# Patient Record
Sex: FEMALE | Race: BLACK OR AFRICAN AMERICAN | NOT HISPANIC OR LATINO | Employment: FULL TIME | URBAN - METROPOLITAN AREA
[De-identification: names, ages, dates, MRNs, and addresses within clinical notes are randomized per-mention and may not be internally consistent; named-entity substitution may affect disease eponyms.]

---

## 2018-05-22 ENCOUNTER — OFFICE VISIT CONVERTED (OUTPATIENT)
Dept: ONCOLOGY | Facility: HOSPITAL | Age: 48
End: 2018-05-22
Attending: INTERNAL MEDICINE

## 2018-05-29 ENCOUNTER — OFFICE VISIT CONVERTED (OUTPATIENT)
Dept: NEUROSURGERY | Facility: CLINIC | Age: 48
End: 2018-05-29
Attending: PHYSICIAN ASSISTANT

## 2018-06-11 ENCOUNTER — OFFICE VISIT CONVERTED (OUTPATIENT)
Dept: ONCOLOGY | Facility: HOSPITAL | Age: 48
End: 2018-06-11
Attending: INTERNAL MEDICINE

## 2018-09-05 ENCOUNTER — OFFICE VISIT CONVERTED (OUTPATIENT)
Dept: NEUROSURGERY | Facility: CLINIC | Age: 48
End: 2018-09-05
Attending: PHYSICIAN ASSISTANT

## 2018-09-27 ENCOUNTER — OFFICE VISIT CONVERTED (OUTPATIENT)
Dept: NEUROSURGERY | Facility: CLINIC | Age: 48
End: 2018-09-27
Attending: NEUROLOGICAL SURGERY

## 2018-11-08 ENCOUNTER — OFFICE VISIT CONVERTED (OUTPATIENT)
Dept: NEUROSURGERY | Facility: CLINIC | Age: 48
End: 2018-11-08
Attending: NEUROLOGICAL SURGERY

## 2018-12-10 ENCOUNTER — OFFICE VISIT CONVERTED (OUTPATIENT)
Dept: ONCOLOGY | Facility: HOSPITAL | Age: 48
End: 2018-12-10
Attending: INTERNAL MEDICINE

## 2019-02-07 ENCOUNTER — OFFICE VISIT CONVERTED (OUTPATIENT)
Dept: NEUROSURGERY | Facility: CLINIC | Age: 49
End: 2019-02-07
Attending: NEUROLOGICAL SURGERY

## 2019-03-12 ENCOUNTER — OFFICE VISIT CONVERTED (OUTPATIENT)
Dept: NEUROSURGERY | Facility: CLINIC | Age: 49
End: 2019-03-12
Attending: NEUROLOGICAL SURGERY

## 2019-04-08 ENCOUNTER — OFFICE VISIT CONVERTED (OUTPATIENT)
Dept: NEUROLOGY | Facility: CLINIC | Age: 49
End: 2019-04-08
Attending: PSYCHIATRY & NEUROLOGY

## 2019-04-23 ENCOUNTER — OFFICE VISIT CONVERTED (OUTPATIENT)
Dept: NEUROSURGERY | Facility: CLINIC | Age: 49
End: 2019-04-23
Attending: NEUROLOGICAL SURGERY

## 2019-06-25 ENCOUNTER — OFFICE VISIT CONVERTED (OUTPATIENT)
Dept: NEUROSURGERY | Facility: CLINIC | Age: 49
End: 2019-06-25
Attending: NEUROLOGICAL SURGERY

## 2019-06-25 ENCOUNTER — CONVERSION ENCOUNTER (OUTPATIENT)
Dept: NEUROLOGY | Facility: CLINIC | Age: 49
End: 2019-06-25

## 2019-09-12 ENCOUNTER — HOSPITAL ENCOUNTER (OUTPATIENT)
Dept: ONCOLOGY | Facility: HOSPITAL | Age: 49
Discharge: HOME | End: 2019-09-12
Attending: INTERNAL MEDICINE

## 2019-09-12 LAB
ALBUMIN SERPL-MCNC: 4.3 G/DL (ref 3.5–5)
ALBUMIN/GLOB SERPL: 1.3 {RATIO} (ref 1.4–2.6)
ALP SERPL-CCNC: 56 U/L (ref 42–98)
ALT SERPL-CCNC: 19 U/L (ref 10–40)
ANION GAP SERPL CALC-SCNC: 16 MMOL/L (ref 8–19)
AST SERPL-CCNC: 25 U/L (ref 15–50)
BASOPHILS # BLD AUTO: 0.02 10*3/UL (ref 0–0.2)
BASOPHILS NFR BLD AUTO: 0.4 % (ref 0–3)
BILIRUB SERPL-MCNC: 0.78 MG/DL (ref 0.2–1.3)
BUN SERPL-MCNC: 12 MG/DL (ref 5–25)
BUN/CREAT SERPL: 16 {RATIO} (ref 6–20)
CALCIUM SERPL-MCNC: 9.7 MG/DL (ref 8.7–10.4)
CHLORIDE SERPL-SCNC: 100 MMOL/L (ref 99–111)
CONV ABS IMM GRAN: 0.02 10*3/UL (ref 0–0.2)
CONV CO2: 29 MMOL/L (ref 22–32)
CONV IMMATURE GRAN: 0.4 % (ref 0–1.8)
CONV TOTAL PROTEIN: 7.7 G/DL (ref 6.3–8.2)
CREAT UR-MCNC: 0.74 MG/DL (ref 0.5–0.9)
DEPRECATED RDW RBC AUTO: 42.7 FL (ref 36.4–46.3)
EOSINOPHIL # BLD AUTO: 0.16 10*3/UL (ref 0–0.7)
EOSINOPHIL # BLD AUTO: 3.3 % (ref 0–7)
ERYTHROCYTE [DISTWIDTH] IN BLOOD BY AUTOMATED COUNT: 12.6 % (ref 11.7–14.4)
GFR SERPLBLD BASED ON 1.73 SQ M-ARVRAT: >60 ML/MIN/{1.73_M2}
GLOBULIN UR ELPH-MCNC: 3.4 G/DL (ref 2–3.5)
GLUCOSE SERPL-MCNC: 108 MG/DL (ref 65–99)
HCT VFR BLD AUTO: 36.2 % (ref 37–47)
HGB BLD-MCNC: 11.5 G/DL (ref 12–16)
LDH SERPL-CCNC: 221 U/L (ref 120–240)
LYMPHOCYTES # BLD AUTO: 1.71 10*3/UL (ref 1–5)
LYMPHOCYTES NFR BLD AUTO: 34.9 % (ref 20–45)
MCH RBC QN AUTO: 29.5 PG (ref 27–31)
MCHC RBC AUTO-ENTMCNC: 31.8 G/DL (ref 33–37)
MCV RBC AUTO: 92.8 FL (ref 81–99)
MONOCYTES # BLD AUTO: 0.29 10*3/UL (ref 0.2–1.2)
MONOCYTES NFR BLD AUTO: 5.9 % (ref 3–10)
NEUTROPHILS # BLD AUTO: 2.7 10*3/UL (ref 2–8)
NEUTROPHILS NFR BLD AUTO: 55.1 % (ref 30–85)
NRBC CBCN: 0 % (ref 0–0.7)
OSMOLALITY SERPL CALC.SUM OF ELEC: 292 MOSM/KG (ref 273–304)
PLATELET # BLD AUTO: 297 10*3/UL (ref 130–400)
PMV BLD AUTO: 9.4 FL (ref 9.4–12.3)
POTASSIUM SERPL-SCNC: 3.5 MMOL/L (ref 3.5–5.3)
RBC # BLD AUTO: 3.9 10*6/UL (ref 4.2–5.4)
SODIUM SERPL-SCNC: 141 MMOL/L (ref 135–147)
WBC # BLD AUTO: 4.9 10*3/UL (ref 4.8–10.8)

## 2019-09-13 LAB
ALBUMIN SERPL-MCNC: 3.5 G/DL (ref 2.9–4.4)
ALBUMIN/GLOB SERPL: 1 {RATIO} (ref 0.7–1.7)
ALPHA2 GLOB SERPL ELPH-MCNC: 0.8 G/DL (ref 0.4–1)
BETA GLOBULIN: 1.1 G/DL (ref 0.7–1.3)
CONV ALPHA-1-GLOBULIN: 0.2 G/DL (ref 0–0.4)
CONV IMMUNOGLOBULIN G (IGG): 1294 MG/DL (ref 700–1600)
CONV IMMUNOGLOBULIN M (IGM): 99 MG/DL (ref 26–217)
CONV PE INTERPRETATION: ABNORMAL
CONV PE NOTE: ABNORMAL
CONV TOTAL PROTEIN: 7.1 G/DL (ref 6–8.5)
FREE LIGHT CHAINS: 19.2 MG/L (ref 3.3–19.4)
GAMMA GLOB SERPL ELPH-MCNC: 1.4 G/DL (ref 0.4–1.8)
GLOBULIN UR ELPH-MCNC: 3.6 G/DL (ref 2.2–3.9)
IGA SERPL-MCNC: 322 MG/DL (ref 87–352)
KAPPA LC/LAMBDA SER: 2.06 {RATIO} (ref 0.26–1.65)
LAMBDA LC FREE SERPL-MCNC: 9.3 MG/L (ref 5.7–26.3)
M-SPIKE: 0.4 G/DL
PROT PATTERN SERPL IFE-IMP: ABNORMAL

## 2019-09-18 ENCOUNTER — HOSPITAL ENCOUNTER (OUTPATIENT)
Dept: ONCOLOGY | Facility: HOSPITAL | Age: 49
Discharge: HOME | End: 2019-09-18
Attending: INTERNAL MEDICINE

## 2019-09-18 ENCOUNTER — OFFICE VISIT CONVERTED (OUTPATIENT)
Dept: ONCOLOGY | Facility: HOSPITAL | Age: 49
End: 2019-09-18
Attending: INTERNAL MEDICINE

## 2019-09-18 LAB
ALBUMIN 24H MFR UR ELPH: 57.2 %
ALPHA1 GLOB 24H MFR UR ELPH: 3.8 %
ALPHA2 GLOB 24H MFR UR ELPH: 9.8 %
B-GLOBULIN MFR UR ELPH: 14.7 %
CONV IMMUNOFIXATION (URINE): NORMAL
CONV PE NOTE: NORMAL
GAMMA GLOB 24H MFR UR ELPH: 14.5 %
M PROTEIN MFR UR ELPH: NORMAL %
PROT UR-MCNC: 6.1 MG/DL

## 2019-09-20 ENCOUNTER — HOSPITAL ENCOUNTER (OUTPATIENT)
Dept: GENERAL RADIOLOGY | Facility: HOSPITAL | Age: 49
Discharge: HOME | End: 2019-09-20
Attending: INTERNAL MEDICINE

## 2019-09-26 ENCOUNTER — OFFICE VISIT CONVERTED (OUTPATIENT)
Dept: NEUROSURGERY | Facility: CLINIC | Age: 49
End: 2019-09-26
Attending: NEUROLOGICAL SURGERY

## 2019-11-07 ENCOUNTER — HOSPITAL ENCOUNTER (OUTPATIENT)
Dept: GENERAL RADIOLOGY | Facility: HOSPITAL | Age: 49
Discharge: HOME | End: 2019-11-07
Attending: INTERNAL MEDICINE

## 2020-01-07 ENCOUNTER — OFFICE VISIT CONVERTED (OUTPATIENT)
Dept: NEUROSURGERY | Facility: CLINIC | Age: 50
End: 2020-01-07
Attending: NEUROLOGICAL SURGERY

## 2020-03-11 ENCOUNTER — HOSPITAL ENCOUNTER (OUTPATIENT)
Dept: ONCOLOGY | Facility: HOSPITAL | Age: 50
Discharge: HOME | End: 2020-03-11
Attending: INTERNAL MEDICINE

## 2020-03-11 LAB
ALBUMIN SERPL-MCNC: 4.1 G/DL (ref 3.5–5)
ALBUMIN/GLOB SERPL: 1.4 {RATIO} (ref 1.4–2.6)
ALP SERPL-CCNC: 51 U/L (ref 42–98)
ALT SERPL-CCNC: 12 U/L (ref 10–40)
ANION GAP SERPL CALC-SCNC: 14 MMOL/L (ref 8–19)
AST SERPL-CCNC: 16 U/L (ref 15–50)
BASOPHILS # BLD AUTO: 0.01 10*3/UL (ref 0–0.2)
BASOPHILS NFR BLD AUTO: 0.2 % (ref 0–3)
BILIRUB SERPL-MCNC: 0.44 MG/DL (ref 0.2–1.3)
BUN SERPL-MCNC: 15 MG/DL (ref 5–25)
BUN/CREAT SERPL: 19 {RATIO} (ref 6–20)
CALCIUM SERPL-MCNC: 9.5 MG/DL (ref 8.7–10.4)
CALCIUM SPEC-SCNC: 9.5 MG/DL (ref 8.7–10.4)
CHLORIDE SERPL-SCNC: 100 MMOL/L (ref 99–111)
CONV ABS IMM GRAN: 0 10*3/UL (ref 0–0.54)
CONV CO2: 29 MMOL/L (ref 22–32)
CONV EOSINOPHILS PERCENT BY MANUAL COUNT: 2.1 % (ref 0–7)
CONV IMMATURE GRAN: 0 % (ref 0–0.4)
CONV TOTAL PROTEIN: 7.1 G/DL (ref 6.3–8.2)
CREAT UR-MCNC: 0.78 MG/DL (ref 0.5–0.9)
EOSINOPHIL # BLD MANUAL: 0.1 10*3/UL (ref 0–0.7)
ERYTHROCYTE [DISTWIDTH] IN BLOOD BY AUTOMATED COUNT: 12.1 % (ref 11.5–14.5)
ERYTHROCYTE [DISTWIDTH] IN BLOOD BY AUTOMATED COUNT: 42 FL
GFR SERPLBLD BASED ON 1.73 SQ M-ARVRAT: >60 ML/MIN/{1.73_M2}
GLOBULIN UR ELPH-MCNC: 3 G/DL (ref 2–3.5)
GLUCOSE SERPL-MCNC: 120 MG/DL (ref 65–99)
HBA1C MFR BLD: 11.1 G/DL (ref 12–16)
HCT VFR BLD AUTO: 34.4 % (ref 37–47)
LDH SERPL-CCNC: 176 U/L (ref 120–240)
LYMPHOCYTES # BLD AUTO: 1.96 10*3/UL (ref 1–5)
LYMPHOCYTES NFR BLD AUTO: 41.2 % (ref 20–45)
MCH RBC QN AUTO: 30.2 PG (ref 27–31)
MCHC RBC AUTO-ENTMCNC: 32.3 G/DL (ref 33–37)
MCV RBC AUTO: 93.5 FL (ref 81–99)
MONOCYTES # BLD AUTO: 0.26 10*3/UL (ref 0.2–1.2)
MONOCYTES NFR BLD MANUAL: 5.5 % (ref 3–10)
NEUTROPHILS # BLD AUTO: 2.43 10*3/UL (ref 2–8)
NEUTROPHILS NFR BLD MANUAL: 51 % (ref 30–85)
OSMOLALITY SERPL CALC.SUM OF ELEC: 292 MOSM/KG (ref 273–304)
PLATELET # BLD AUTO: 265 10*3/UL (ref 130–400)
PMV BLD AUTO: 8.6 FL (ref 7.4–10.4)
POTASSIUM SERPL-SCNC: 3.2 MMOL/L (ref 3.5–5.3)
RBC MORPH BLD: 3.68 10*6/UL (ref 4.2–5.4)
SODIUM SERPL-SCNC: 140 MMOL/L (ref 135–147)
WBC # BLD AUTO: 4.76 10*3/UL (ref 4.8–10.8)

## 2020-03-12 LAB
ALBUMIN SERPL-MCNC: 3.6 G/DL (ref 2.9–4.4)
ALBUMIN/GLOB SERPL: 1.1 {RATIO} (ref 0.7–1.7)
ALPHA2 GLOB SERPL ELPH-MCNC: 0.7 G/DL (ref 0.4–1)
BETA GLOBULIN: 1.1 G/DL (ref 0.7–1.3)
CONV ALPHA-1-GLOBULIN: 0.2 G/DL (ref 0–0.4)
CONV IMMUNOGLOBULIN G (IGG): 1295 MG/DL (ref 700–1600)
CONV IMMUNOGLOBULIN M (IGM): 91 MG/DL (ref 26–217)
CONV PE INTERPRETATION: ABNORMAL
CONV PE NOTE: ABNORMAL
CONV TOTAL PROTEIN: 6.8 G/DL (ref 6–8.5)
FREE LIGHT CHAINS: 15.8 MG/L (ref 3.3–19.4)
GAMMA GLOB SERPL ELPH-MCNC: 1.1 G/DL (ref 0.4–1.8)
GLOBULIN UR ELPH-MCNC: 3.2 G/DL (ref 2.2–3.9)
IGA SERPL-MCNC: 291 MG/DL (ref 87–352)
KAPPA LC/LAMBDA SER: 2.51 {RATIO} (ref 0.26–1.65)
LAMBDA LC FREE SERPL-MCNC: 6.3 MG/L (ref 5.7–26.3)
M-SPIKE: 0.4 G/DL
PROT PATTERN SERPL IFE-IMP: ABNORMAL

## 2020-03-17 LAB
ALBUMIN 24H MFR UR ELPH: 41.1 %
ALPHA1 GLOB 24H MFR UR ELPH: 2.5 %
ALPHA2 GLOB 24H MFR UR ELPH: 11 %
B-GLOBULIN MFR UR ELPH: 30.8 %
CONV IMMUNOFIXATION (URINE): NORMAL
CONV PE NOTE: NORMAL
GAMMA GLOB 24H MFR UR ELPH: 14.7 %
M PROTEIN MFR UR ELPH: NORMAL %
PROT UR-MCNC: 39.4 MG/DL

## 2020-03-18 ENCOUNTER — HOSPITAL ENCOUNTER (OUTPATIENT)
Dept: ONCOLOGY | Facility: HOSPITAL | Age: 50
Discharge: HOME | End: 2020-03-18
Attending: INTERNAL MEDICINE

## 2020-03-18 ENCOUNTER — OFFICE VISIT CONVERTED (OUTPATIENT)
Dept: ONCOLOGY | Facility: HOSPITAL | Age: 50
End: 2020-03-18
Attending: INTERNAL MEDICINE

## 2020-09-08 ENCOUNTER — HOSPITAL ENCOUNTER (OUTPATIENT)
Dept: OTHER | Facility: HOSPITAL | Age: 50
Discharge: HOME | End: 2020-09-08
Attending: INTERNAL MEDICINE

## 2020-09-08 LAB
ALBUMIN SERPL-MCNC: 4.5 G/DL (ref 3.5–5)
ALBUMIN/GLOB SERPL: 1.4 {RATIO} (ref 1.4–2.6)
ALP SERPL-CCNC: 56 U/L (ref 42–98)
ALT SERPL-CCNC: 17 U/L (ref 10–40)
ANION GAP SERPL CALC-SCNC: 16 MMOL/L (ref 8–19)
AST SERPL-CCNC: 24 U/L (ref 15–50)
BASOPHILS # BLD AUTO: 0.02 10*3/UL (ref 0–0.2)
BASOPHILS NFR BLD AUTO: 0.4 % (ref 0–3)
BILIRUB SERPL-MCNC: 0.46 MG/DL (ref 0.2–1.3)
BUN SERPL-MCNC: 17 MG/DL (ref 5–25)
BUN/CREAT SERPL: 22 {RATIO} (ref 6–20)
CALCIUM SERPL-MCNC: 9.8 MG/DL (ref 8.7–10.4)
CHLORIDE SERPL-SCNC: 99 MMOL/L (ref 99–111)
CONV ABS IMM GRAN: 0.01 10*3/UL (ref 0–0.2)
CONV CO2: 27 MMOL/L (ref 22–32)
CONV IMMATURE GRAN: 0.2 % (ref 0–1.8)
CONV TOTAL PROTEIN: 7.8 G/DL (ref 6.3–8.2)
CREAT UR-MCNC: 0.78 MG/DL (ref 0.5–0.9)
DEPRECATED RDW RBC AUTO: 43.9 FL (ref 36.4–46.3)
EOSINOPHIL # BLD AUTO: 0.13 10*3/UL (ref 0–0.7)
EOSINOPHIL # BLD AUTO: 2.5 % (ref 0–7)
ERYTHROCYTE [DISTWIDTH] IN BLOOD BY AUTOMATED COUNT: 12.6 % (ref 11.7–14.4)
GFR SERPLBLD BASED ON 1.73 SQ M-ARVRAT: >60 ML/MIN/{1.73_M2}
GLOBULIN UR ELPH-MCNC: 3.3 G/DL (ref 2–3.5)
GLUCOSE SERPL-MCNC: 122 MG/DL (ref 65–99)
HCT VFR BLD AUTO: 39.6 % (ref 37–47)
HGB BLD-MCNC: 12.4 G/DL (ref 12–16)
LDH SERPL-CCNC: 183 U/L (ref 120–240)
LYMPHOCYTES # BLD AUTO: 2.14 10*3/UL (ref 1–5)
LYMPHOCYTES NFR BLD AUTO: 40.5 % (ref 20–45)
MCH RBC QN AUTO: 29.7 PG (ref 27–31)
MCHC RBC AUTO-ENTMCNC: 31.3 G/DL (ref 33–37)
MCV RBC AUTO: 94.7 FL (ref 81–99)
MONOCYTES # BLD AUTO: 0.27 10*3/UL (ref 0.2–1.2)
MONOCYTES NFR BLD AUTO: 5.1 % (ref 3–10)
NEUTROPHILS # BLD AUTO: 2.72 10*3/UL (ref 2–8)
NEUTROPHILS NFR BLD AUTO: 51.3 % (ref 30–85)
NRBC CBCN: 0 % (ref 0–0.7)
OSMOLALITY SERPL CALC.SUM OF ELEC: 291 MOSM/KG (ref 273–304)
PLATELET # BLD AUTO: 300 10*3/UL (ref 130–400)
PMV BLD AUTO: 9.9 FL (ref 9.4–12.3)
POTASSIUM SERPL-SCNC: 3.1 MMOL/L (ref 3.5–5.3)
RBC # BLD AUTO: 4.18 10*6/UL (ref 4.2–5.4)
SODIUM SERPL-SCNC: 139 MMOL/L (ref 135–147)
WBC # BLD AUTO: 5.29 10*3/UL (ref 4.8–10.8)

## 2020-09-09 LAB
ALBUMIN SERPL-MCNC: 3.7 G/DL (ref 2.9–4.4)
ALBUMIN/GLOB SERPL: 1 {RATIO} (ref 0.7–1.7)
ALPHA2 GLOB SERPL ELPH-MCNC: 0.8 G/DL (ref 0.4–1)
BETA GLOBULIN: 1.2 G/DL (ref 0.7–1.3)
CONV ALPHA-1-GLOBULIN: 0.3 G/DL (ref 0–0.4)
CONV IMMUNOGLOBULIN G (IGG): 1414 MG/DL (ref 586–1602)
CONV IMMUNOGLOBULIN M (IGM): 93 MG/DL (ref 26–217)
CONV PE INTERPRETATION: ABNORMAL
CONV PE NOTE: ABNORMAL
CONV TOTAL PROTEIN: 7.5 G/DL (ref 6–8.5)
FREE LIGHT CHAINS: 19.3 MG/L (ref 3.3–19.4)
GAMMA GLOB SERPL ELPH-MCNC: 1.5 G/DL (ref 0.4–1.8)
GLOBULIN UR ELPH-MCNC: 3.8 G/DL (ref 2.2–3.9)
IGA SERPL-MCNC: 336 MG/DL (ref 87–352)
KAPPA LC/LAMBDA SER: 1.91 {RATIO} (ref 0.26–1.65)
LAMBDA LC FREE SERPL-MCNC: 10.1 MG/L (ref 5.7–26.3)
M-SPIKE: 0.4 G/DL
PROT PATTERN SERPL IFE-IMP: ABNORMAL

## 2020-09-14 ENCOUNTER — HOSPITAL ENCOUNTER (OUTPATIENT)
Dept: GENERAL RADIOLOGY | Facility: HOSPITAL | Age: 50
Discharge: HOME | End: 2020-09-14
Attending: INTERNAL MEDICINE

## 2020-09-15 ENCOUNTER — OFFICE VISIT CONVERTED (OUTPATIENT)
Dept: ONCOLOGY | Facility: HOSPITAL | Age: 50
End: 2020-09-15
Attending: INTERNAL MEDICINE

## 2020-09-15 ENCOUNTER — HOSPITAL ENCOUNTER (OUTPATIENT)
Dept: OTHER | Facility: HOSPITAL | Age: 50
Discharge: HOME | End: 2020-09-15
Attending: INTERNAL MEDICINE

## 2020-12-09 ENCOUNTER — HOSPITAL ENCOUNTER (OUTPATIENT)
Dept: GASTROENTEROLOGY | Facility: HOSPITAL | Age: 50
Setting detail: HOSPITAL OUTPATIENT SURGERY
Discharge: HOME | End: 2020-12-09
Attending: INTERNAL MEDICINE

## 2021-05-15 VITALS
WEIGHT: 201.56 LBS | DIASTOLIC BLOOD PRESSURE: 82 MMHG | SYSTOLIC BLOOD PRESSURE: 130 MMHG | BODY MASS INDEX: 31.64 KG/M2 | HEIGHT: 67 IN

## 2021-05-15 VITALS
DIASTOLIC BLOOD PRESSURE: 80 MMHG | WEIGHT: 198 LBS | SYSTOLIC BLOOD PRESSURE: 141 MMHG | HEIGHT: 67 IN | BODY MASS INDEX: 31.08 KG/M2

## 2021-05-15 VITALS
HEIGHT: 67 IN | SYSTOLIC BLOOD PRESSURE: 131 MMHG | WEIGHT: 190.56 LBS | DIASTOLIC BLOOD PRESSURE: 72 MMHG | BODY MASS INDEX: 29.91 KG/M2

## 2021-05-15 VITALS
SYSTOLIC BLOOD PRESSURE: 122 MMHG | HEIGHT: 67 IN | WEIGHT: 208.12 LBS | DIASTOLIC BLOOD PRESSURE: 79 MMHG | BODY MASS INDEX: 32.66 KG/M2

## 2021-05-15 VITALS
DIASTOLIC BLOOD PRESSURE: 83 MMHG | SYSTOLIC BLOOD PRESSURE: 124 MMHG | HEIGHT: 67 IN | BODY MASS INDEX: 32.35 KG/M2 | WEIGHT: 206.12 LBS

## 2021-05-15 VITALS
HEIGHT: 67 IN | SYSTOLIC BLOOD PRESSURE: 116 MMHG | BODY MASS INDEX: 30.01 KG/M2 | WEIGHT: 191.19 LBS | DIASTOLIC BLOOD PRESSURE: 68 MMHG

## 2021-05-16 VITALS
WEIGHT: 208 LBS | SYSTOLIC BLOOD PRESSURE: 125 MMHG | BODY MASS INDEX: 32.65 KG/M2 | HEIGHT: 67 IN | DIASTOLIC BLOOD PRESSURE: 76 MMHG

## 2021-05-16 VITALS
SYSTOLIC BLOOD PRESSURE: 136 MMHG | HEIGHT: 67 IN | BODY MASS INDEX: 30.92 KG/M2 | DIASTOLIC BLOOD PRESSURE: 80 MMHG | WEIGHT: 197 LBS

## 2021-05-16 VITALS — WEIGHT: 215 LBS | HEIGHT: 67 IN | HEART RATE: 88 BPM | BODY MASS INDEX: 33.74 KG/M2

## 2021-05-28 VITALS
HEIGHT: 68 IN | SYSTOLIC BLOOD PRESSURE: 145 MMHG | HEART RATE: 66 BPM | WEIGHT: 211.42 LBS | DIASTOLIC BLOOD PRESSURE: 79 MMHG | HEART RATE: 75 BPM | BODY MASS INDEX: 32.38 KG/M2 | BODY MASS INDEX: 32.04 KG/M2 | TEMPERATURE: 97.5 F | OXYGEN SATURATION: 100 % | HEIGHT: 68 IN | SYSTOLIC BLOOD PRESSURE: 148 MMHG | DIASTOLIC BLOOD PRESSURE: 74 MMHG | RESPIRATION RATE: 16 BRPM | WEIGHT: 213.63 LBS | TEMPERATURE: 97.4 F | OXYGEN SATURATION: 100 %

## 2021-05-28 VITALS
BODY MASS INDEX: 33.77 KG/M2 | RESPIRATION RATE: 16 BRPM | BODY MASS INDEX: 29.6 KG/M2 | BODY MASS INDEX: 32.7 KG/M2 | WEIGHT: 208.78 LBS | TEMPERATURE: 97.8 F | WEIGHT: 195.33 LBS | SYSTOLIC BLOOD PRESSURE: 137 MMHG | OXYGEN SATURATION: 100 % | TEMPERATURE: 97.9 F | OXYGEN SATURATION: 100 % | DIASTOLIC BLOOD PRESSURE: 92 MMHG | HEIGHT: 68 IN | TEMPERATURE: 98 F | SYSTOLIC BLOOD PRESSURE: 152 MMHG | RESPIRATION RATE: 14 BRPM | OXYGEN SATURATION: 100 % | RESPIRATION RATE: 20 BRPM | SYSTOLIC BLOOD PRESSURE: 127 MMHG | HEART RATE: 69 BPM | HEART RATE: 71 BPM | HEART RATE: 68 BPM | WEIGHT: 215.61 LBS | DIASTOLIC BLOOD PRESSURE: 80 MMHG | DIASTOLIC BLOOD PRESSURE: 80 MMHG

## 2021-05-28 VITALS
BODY MASS INDEX: 34.77 KG/M2 | OXYGEN SATURATION: 98 % | SYSTOLIC BLOOD PRESSURE: 154 MMHG | WEIGHT: 222 LBS | RESPIRATION RATE: 16 BRPM | HEART RATE: 79 BPM | DIASTOLIC BLOOD PRESSURE: 68 MMHG | TEMPERATURE: 97.1 F

## 2021-05-28 NOTE — PROGRESS NOTES
Patient: PAULETTE BRODERICK     Acct: [id number]     Report: #[id number]  UNIT #: [id number]     : 1970    Encounter Date:2018  PRIMARY CARE: ELIZABETH GOMEZ  ***Signed***  --------------------------------------------------------------------------------------------------------------------  NURSE INTAKE      Encounter Date      May 22, 2018            Providers      Referring Provider:  LOLA RAMIRES      Primary Provider:  ELIZABETH GOMEZ            Visit Type      New Patient Visit            Chief Complaint      ELEVATED M SPIKE            PAST, FAMILY   Past Medical History      Past Medical History:  No Diabetes Type 1, No Diabetes Type 2, No Thyroid     Disease, No COPD, No Emphysema, Yes Hypertension, No Stroke, No High Cholesterol    , No Heart Attack, No Bleeding Condition, No Low or High RBC Count, No Low or     High WBC Count, No Low or High Platelet Coun, No Hepatitis, No Kidney Disease,     No Depression, No Alzheimer's Disease, No Mental Disease, No Seizures, Yes     Arthritis, No Osteoporosis, No Osteopenia, No Short of Air, No Sleep apnea, No     Liver Disease, No STD, No Enlarged Prostate, No Other      Hematology/oncology:  DENIES HX OF: Previous Treatment for CA, Anemia, Bladder     Cancer, Blood cancer, Brain cancer, Breast cancer, Cervical cancer, Coagulopathy    , Colorectal cancer, Endocrine cancer, Eye cancer, GI cancer,  cancer, Kidney     cancer, Leukemia, Leukocytosis, Leukopenia, Liver cancer, Lung cancer, Lymphoma    , Musculoskeletal cancer, Myeloma, Neurologic cancer, Oral cancer, Ovarian     cancer, Skin cancer, Stomach cancer, Thrombocytopenia, Thyroid cancer, Uterine     cancer, Other cancer history, Other hematologic history      Genetic/metabolic:  DENIES HX OF: Cystic fibrosis, Down syndrome, Other genetic     history, Other metabolic history            Past Surgical History      REPORTS HX OF: Hysterectomy, Other Past Surgical Hx (BREAST REDUCTION), DENIES     HX OF:  Cataract extraction, Thyroid surgery, Lung biopsy, CABG surgery,     Coronary stent, Valve replacement, Appendectomy, Cholecystectomy, Splenectomy,     Bladder surgery, Nephrectomy, Joint replacement, Frature repair, Skin cancer     removal, Melanoma excision, Spinal surgery, Breast biopsy, Lumpectomy,     Mastectomy, bilateral, Mastectomy, right, Mastectomy, left, Peg Tube Placement,     VAD Placement, Biopsy            Family History      DENIES HX OF: Anemia, Blood disorders, Blood Cancer, Breast cancer, Cervical     cancer, Coagulopathy, Colorectal cancer, Endocrine Cancer, Eye Cancer, GI Cancer    ,  Cancer, Kidney Cancer, Leukemia, Leukocytosis, Leukopenia, Liver Cancer,     Lung cancer, Lymphoma, Melanoma, Musculoskeletal Cancer, Myeloma, Neurologic     Cancer, Oral Cancer, Ovarian cancer, Prostate cancer, Skin Cancer, Stomach     Cancer, Testicular Cancer, Thrombocytopenia, Thyroid cancer, Uterine cancer,     Other Cancer History, Other Hematology History            Social History      Lives independently:  No            Tobacco Use      Tobacco status:  Never smoker            Alcohol Use      Alcohol intake:  None            Substance Use      Substance use:  Denies use            REVIEW OF SYSTEMS      General:  Denies: Appetite change, Excessive sweating, Fatigue, Fever, Night     sweats, Weight gain, Weight loss, Other      Eyes:  Denies: Blurred vision, Corrective lenses, Diplopia, Eye irritation, Eye     pain, Eye redness, Spots in vision, Vision loss, Other      Ears, nose, mouth, throat:  Denies: Headache, Seizures, Visual Changes, Hearing     loss, Sinus Congestion, Hoarseness, Sore throat, Other      Cardiovascular:  Denies: Chest pain, Irregular heartbeat, Palpitations, Swollen     ankles/legs, Other      Respiratory:  Denies: Chest pain, Shortness of Air, Productive cough, Coughing     blood, Other      Gastrointestinal:  Denies: Nausea, Vomiting, Problem swallowing, Frequent     heartburn,  Constipation, Diarrhea, Tarry stools, Bloody stools, Unable to     control bowels, Other      Kidney/Bladder:  Denies: Painful Urination, Change in urinary stream, Blood in     urine, Incontinence, Frequent Urination, Decreased urine stream, Other      Musculoskeletal:  Denies: New Back pain, Leg Cramps, Painful Joints, Swollen     Joints, Muscle Pain, Muscle weakness, Other      Skin:  DENIES: Jaundice, Easy Bleeding, Lesions/changes in moles, Nail changes,     Skin Discoloration, Rash, Other      Neurological:  Denies: Dizziness, Fainting, Numbness\Tingling, Paralysis,     Seizures, Other      Psychiatric:  Complains of: AAO X 3, Denies: Anxiety, Panic attacks, Depression    , Memory loss, Other      Endocrine:  DiabetesThyroid DisorderOsteoporosisEndocrine Other      Hematologic/lymphatic:  Denies: Bruising, Bleeding, Enlarged Lymph Nodes,     Recurrent infections, Other      Reproductive:  Denies Pregnant, Denies Menopause, Denies Still Menstruating,     Denies Heavy Periods, Denies Other            VITAL SIGNS AND SCORES      Vitals      Height 5 ft 8.31 in / 173.5 cm      Weight 213 lbs 10.012 oz / 96.9 kg      BSA 2.19 m2      BMI 32.2 kg/m2      Temperature 97.5 F / 36.39 C - Temporal      Pulse 75      Blood Pressure 145/79 Sitting, Left Arm      Pulse Oximetry 100%, ROOM AIR            Pain Score      Pain Assessment:           Experiencing any pain?:  No         Pain Scale Used:  Numerical         Pain Intensity:  0            Fatigue Score               Experiencing any fatigue?:  No         Fatigue (0-10 scale):  0 (none)            EXAM      General Appearance:  Alert, Oriented X3, Cooperative      Eyes:  Anicteric Sclerae, Moist Conjunctiva      HEENT:  Orophraynx clear, No Erythema, No Exudates      Neck:  Supple, Full ROM      Respiratory:  CTAB, No Diminished Breath      Abdomen\Gastro:  Soft, No NABS      Cardio:  RRR, No Murmur, No, Peripheral Edema      Skin:  Normal Temperature, No Induration       Psychiatric:  Appropriate Affect, Intact Judgement, AAO x3      Muscularskeletal:  Normal Gait and Station, Full ROM of extremeties      Lymphatic:  No Cervical, No Supraclavicular, No Infraclavicular, No Axillary,     No Inguinal, No Other            PREVENTION      Hx Influenza Vaccination:  Yes      Date Influenza Vaccine Given:  Oct 1, 2017      Influenza Vaccine Declined:  No      2 or More Falls Past Year?:  No      Fall Past Year with Injury?:  No      Hx Pneumococcal Vaccination:  Yes      Encouraged to follow-up with:  PCP regarding preventative exams.      Chart initiated by      WEN LEBLANC CMA            IMPRESSION/PLAN      Plan      Monoclonal gammopathy of unknown significance (MGUS) - D47.2            Notes      New Diagnostics      * IMMUNOGLOBULIN QUANT IGAMQ, Stat       Dx: Monoclonal gammopathy of unknown significance (MGUS) - D47.2      * FREE KAPPA LAMBDA LT CHAINS QU, Stat      * PROTEIN ELECTROPH SERUM, Stat      * CBC, Stat       Dx: Monoclonal gammopathy of unknown significance (MGUS) - D47.2      * Comp Metabolic Panel, Stat       Dx: Monoclonal gammopathy of unknown significance (MGUS) - D47.2      * LDH, Stat       Dx: Monoclonal gammopathy of unknown significance (MGUS) - D47.2      * Skeletal Survey Adult, Routine       Dx: Monoclonal gammopathy of unknown significance (MGUS) - D47.2            Patient Education      Patient Education Provided:  Yes            Electronically signed by POP PORTILLO  06/29/2020 10:55       Disclaimer: Converted document may not contain table formatting or lab diagrams. Please see [employer] for the authenticated document.

## 2021-05-28 NOTE — PROGRESS NOTES
Patient: PAULETTE BRODERICK     Acct: [id number]     Report: #[id number]  UNIT #: [id number]     : 1970    Encounter Date:2019  PRIMARY CARE: ELIZABETH GOMEZ  ***Signed***  --------------------------------------------------------------------------------------------------------------------  NURSE INTAKE      Visit Type      Established Patient Visit            Chief Complaint      FOLLOW UP            Referring Provider/Copies To      Referring Provider:  LOLA RAMIRES      Primary Care Provider:  ELIZABETH GOMEZ            History and Present Illness      Past History      Mrs. Brodercik is a very pleasant 47-year-old -American lady who presented     with left arm numbness because of which she underwent MRI of the spine. MRI of     the spine was abnormal with degenerative changes as well as abnormal signal conc    erning for primary bone marrow problem. Then patient underwent further workup     including serum protein electrophoresis and was found to have monoclonal IgG     kappa band in the gamma region. Her monoclonal spike was 0.7 g because of which     she is now being referred to hematology. Patient denies any family history of     multiple myeloma. Patient has already seen neurologist and is considering spinal    surgery for degenerative disease.      Gammaglobulin was 1.5 g and monoclonal spike was 0.7 g in (IgG kappa ).            [facility] - Hematology Interim      Patient returns today for follow-up of monoclonal protein.  Since her last     visit, she has had surgery on her neck.  She states that the surgery went well     but she is still having some numbness and.  She denies any new or unusual aches     or pains.  She denies any masses or lymphadenopathy.  She reports good appetite     and her weight is maintained.  She reports good energy level.  She notes normal     bladder and bowel habits.  No other complaints today.            Most Recent Lab Findings            Item Value  Date Time              M-Tom (NIXON) 0.4 g/dL H 9/12/19 1015             Free Kappa Light Chains 19.2 mg/L 9/12/19 1015             Free Lambda Light Chains 9.3 mg/L 9/12/19 1015             Free Kappa/Lambda Light Chain Ratio 2.06 NA H 9/12/19 1015            Laboratory Tests      9/12/19 10:15            PAST, FAMILY   Past Medical History      Past Medical History:  Hypertension      Other PMH:        ELEV. M SPIKE      Hematology/Oncology (F):  Breast Cancer      Other Hematology History:        ELEV. M SPIKE            Past Surgical History      Hysterectomy            BREAST REDUCTION, HERNIA REPAIR, CERVIX            Family History      Family History:  Breast Cancer (MATERNAL AUNT)            Social History      Marital Status:        Lives independently:  Yes      Number of Children:  1      Occupation:  SOLDIER            Tobacco Use      Tobacco status:  Never smoker            Alcohol Use      Alcohol intake:  None            Substance Use      Substance use:  Denies use            REVIEW OF SYSTEMS      General:  Admits: Fatigue, Weight Gain      Eye:  Admits Corrective Lenses      ENT:  Denies Hearing Loss      Cardiovascular:  Denies Chest Pain      Musculoskeletal:  Denies Muscle Pain, Denies Painful Joints      Integumentary:  Denies Itching      Neurologic:  Denies Dizziness, Denies Numbness\Tingling            VITAL SIGNS AND SCORES      Vitals      Weight 208 lbs 12.410 oz / 94.7 kg      Temperature 98 F / 36.67 C - Temporal      Pulse 69      Respirations 14      Blood Pressure 127/80 Sitting, Left Arm      Pulse Oximetry 100%, RM AIR            Pain Score      Pain Scale Used:  Numerical      Pain Intensity:  0            Fatigue Score      Fatigue (0-10 scale):  5            EXAM      General Appearance:  Positive for: Alert, Oriented x3, Cooperative;          Negative for: Acute Distress      Eye:  Positive for: Anicteric Sclerae, Moist Conjunctiva      Respiratory:  Positive for: CTAB, Normal Respiratory  Effort      Abdomen/Gastro:  Positive for: Normal Active Bowel Sounds, Soft;          Negative for: Distention, Hepatosplenomegaly, Tenderness      Cardiovascular:  Positive for: RRR;          Negative for: Gallop, Murmur, Peripheral Edema, Rub      Psychiatric:  Positive for: Appropriate Affect, Intact Judgement      Lymphatic:  Negative for: Axillary, Cervical, Infraclavicular, Supraclavicular            PREVENTION      Hx Influenza Vaccination:  Yes      Date Influenza Vaccine Given:  Oct 1, 2018      Influenza Vaccine Declined:  No      2 or More Falls Past Year?:  No      Fall Past Year with Injury?:  No      Hx Pneumococcal Vaccination:  No      Encouraged to follow-up with:  PCP regarding preventative exams.      Chart initiated by      SUREKHA MARIO MA            ALLERGY/MEDS      Allergies      Coded Allergies:             NO KNOWN ALLERGIES (Unverified , 9/18/19)            Medications      Last Reconciled on 12/10/18 08:42 by MARIA ELENA OLIVEIRA      Telmisartan/Hydrochlorothiazid (Micardis HCT 80-12.5 MG) 1 Each Tablet      1 TAB PO QDAY, TAB         Reported         9/18/19       DULoxetine (Cymbalta) 30 Mg Capsule.dr      60 MG PO HS, #60 CAP 0 Refills         Reported         9/18/19       (Hair Skin Nail )   No Conflict Check      1 TAB PO QDAY         Reported         8/1/18       Multivitamin/Iron/Folic Acid (CENTRUM COMPLETE MULTIVIT TAB) 1 Tab Tablet      1 TAB PO QDAY, #30 TAB 0 Refills         Reported         8/1/18       Cyanocobalamin (Vitamin B-12) 5,000 Mcg Tab.subl      5000 MCG SL QDAY, TAB         Reported         8/1/18       Cholecalciferol (Vitamin D3) 5,000 Unit Capsule      5000 UNITS PO QDAY for 30 Days, #30 CAP         Reported         8/1/18       Ubidecarenone (Co Q-10) 1 Each Capsule      100 MG PO QDAY, CAP         Reported         8/1/18       Folic Acid/Vit Bcomp,C (Super B-Complex Folic-Vit C Tb) 400 Mcg Tablet      1 TAB PO QDAY, TAB         Reported         8/1/18        Ferrous Sulfate (Iron Sulfate*) 325 Mg Tablet      325 MG PO QDAY, #30 TAB 0 Refills         Reported         8/1/18       Calcium/Vitamin D (Caltrate-D) 1 Each Tablet      1 EACH PO QDAY, #30 TAB         Reported         8/1/18       amLODIPine (amLODIPine) 10 Mg Tablet      10 MG PO QDAY, #30 TAB 0 Refills         Reported         8/1/18      Medications Reviewed:  No Changes made to meds            IMPRESSION/PLAN      Diagnosis      MGUS (monoclonal gammopathy of unknown significance) - D47.2      Patient's recent lab work continues to demonstrate a small monoclonal protein     which is stable from previous.  Her other lab work likewise remains quite     reasonable.  No evidence of progression at this point.  Continue monitoring.      RTC 6 months for the purpose with labs prior            Notes      New Medications      * DULoxetine (Cymbalta) 30 MG CAPSULE.DR: 60 MG PO HS #60      * Telmisartan/Hydrochlorothiazid (Micardis HCT 80-12.5 MG) 1 EACH TABLET: 1 TAB       PO QDAY      Discontinued Medications      * oxyCODONE-Acetaminophen 5-325 MG 1 EACH TABLET: 1-2 TAB PO Q4H PRN PAIN #36      New Diagnostics      * CBC With Auto Diff, 1 DAY         Dx: MGUS (monoclonal gammopathy of unknown significance) - D47.2      * CMP Comp Metabolic Panel, 1 DAY         Dx: MGUS (monoclonal gammopathy of unknown significance) - D47.2      * IMMUNOFIX PROT ELECTRO URINE, 1 DAY         Dx: MGUS (monoclonal gammopathy of unknown significance) - D47.2      * IMMUNOGLOBULIN QUANT IGAMQ, 1 DAY         Dx: MGUS (monoclonal gammopathy of unknown significance) - D47.2      * FREE KAPPA LAMBDA LT CHAINS QU, 1 DAY         Dx: MGUS (monoclonal gammopathy of unknown significance) - D47.2      * LDH, 1 DAY         Dx: MGUS (monoclonal gammopathy of unknown significance) - D47.2      * SPEP with Immuno (IEPS), 1 DAY         Dx: MGUS (monoclonal gammopathy of unknown significance) - D47.2      * Skeletal Survey Adult, 6 Months         Dx:  MGUS (monoclonal gammopathy of unknown significance) - D47.2      * CBC With Auto Diff, 6 Months         Dx: MGUS (monoclonal gammopathy of unknown significance) - D47.2      * CMP Comp Metabolic Panel, 6 Months         Dx: MGUS (monoclonal gammopathy of unknown significance) - D47.2      * LDH, 6 Months         Dx: MGUS (monoclonal gammopathy of unknown significance) - D47.2      * IMMUNOGLOBULIN QUANT IGAMQ, 6 Months         Dx: MGUS (monoclonal gammopathy of unknown significance) - D47.2      * IMMUNOFIX PROT ELECTRO URINE, 6 Months         Dx: MGUS (monoclonal gammopathy of unknown significance) - D47.2      * SPEP with Immuno (IEPS), 6 Months         Dx: MGUS (monoclonal gammopathy of unknown significance) - D47.2      * FREE KAPPA LAMBDA LT CHAINS QU, 6 Months         Dx: MGUS (monoclonal gammopathy of unknown significance) - D47.2            Patient Education      Patient Education Provided:  Yes                 Disclaimer: Converted document may not contain table formatting or lab diagrams. Please see [employer] [facility] for the authenticated document.

## 2021-05-28 NOTE — PROGRESS NOTES
Patient: PAULETTE BRODERICK     Acct: [id number]     Report: #[id number]  UNIT #: [id number]     : 1970    Encounter Date:2018  PRIMARY CARE: ELIZABETH GOMEZ  ***Signed***  --------------------------------------------------------------------------------------------------------------------  HISTORY OF PRESENT ILLNESS      History and Physical            Mrs. Broderick is a very pleasant 47-year-old -American lady who presented     with left arm numbness because of which she underwent MRI of the spine. MRI of     the spine was abnormal with degenerative changes as well as abnormal signal     concerning for primary bone marrow problem. Then patient underwent further     workup including serum protein electrophoresis and was found to have monoclonal     IgG kappa band in the gamma region. Her monoclonal spike was 0.7 g because of     which she is now being referred to hematology. Patient denies any family     history of multiple myeloma. Patient has already seen neurologist and is     considering spinal surgery for degenerative disease.            Most Recent Lab Findings      Gammaglobulin was 1.5 g and monoclonal spike was 0.7 g in (IgG kappa )            EXAM      Vitals            Vital Signs              Date Time  Temp Pulse Resp B/P (MAP) Pulse Ox O2 Delivery O2 Flow Rate FiO2             18 08:57 97.5 75  145/79 100 ROOM AIR              General Appearance:  Alert, Oriented X3, Cooperative      Eyes:  Anicteric Sclerae, Moist Conjunctiva      HEENT:  Orophraynx clear, No Erythema, No Exudates      Neck:  Supple, Full ROM      Respiratory:  CTAB, No Diminished Breath      Abdomen\Gastro:  Soft, No NABS      Cardio:  RRR, No Murmur, No, Peripheral Edema      Skin:  Normal Temperature, No Induration      Psychiatric:  Appropriate Affect, Intact Judgement, AAO x3      Muscularskeletal:  Normal Gait and Station, Full ROM of extremeties      Lymphatic:  No Cervical, No Supraclavicular, No  Infraclavicular, No Axillary,     No Inguinal, No Other            IMPRESSION/PLAN      Current Plan            Mrs. Valente is a very pleasant lady who is otherwise asymptomatic other than     left arm numbness which is related to degenerative spinal disease. Patient was     found to have monoclonal protein on serum protein electrophoresis. Our plan is     to complete the workup for myeloma which will include quantitative     immunoglobulins as well as serum kappa and lambda light chains and skeletal     survey. If there is further concern on lab work then we may proceed with bone     marrow aspirate and biopsy. Patient was reassured that most likely she has     monoclonal gammopathy of uncertain significance rather than active multiple     myeloma. Patient is relatively young even further diagnosis of MGUS and will     need close surveillance because of risk of progression to multiple myeloma.     Patient had number of questions which were answered to her satisfaction.            Plan      Monoclonal gammopathy of unknown significance (MGUS) - D47.2            Notes      New Diagnostics      * IMMUNOGLOBULIN QUANT IGAMQ, Stat       Dx: Monoclonal gammopathy of unknown significance (MGUS) - D47.2      * FREE KAPPA LAMBDA LT CHAINS QU, Stat      * PROTEIN ELECTROPH SERUM, Stat      * CBC, Stat       Dx: Monoclonal gammopathy of unknown significance (MGUS) - D47.2      * Comp Metabolic Panel, Stat       Dx: Monoclonal gammopathy of unknown significance (MGUS) - D47.2      * LDH, Stat       Dx: Monoclonal gammopathy of unknown significance (MGUS) - D47.2      * Skeletal Survey Adult, Routine       Dx: Monoclonal gammopathy of unknown significance (MGUS) - D47.2                 Disclaimer: Converted document may not contain table formatting or lab diagrams. Please see [employer] [facility] for the authenticated document.

## 2021-05-28 NOTE — PROGRESS NOTES
Patient: PAULETTE BRODERICK     Acct: [id number]     Report: #[id number]  UNIT #: [id number]     : 1970    Encounter Date:09/15/2020  PRIMARY CARE: ELIZABETH GOMEZ  ***Signed***  --------------------------------------------------------------------------------------------------------------------  NURSE INTAKE      Visit Type      Established Patient Visit            Chief Complaint      monoclonal gammmopathy            Referring Provider/Copies To      Primary Care Provider:  ELIZABETH GOMEZ      Copies To:   ELIZABETH GOMEZ            History and Present Illness      Past Oncology Illness History      alejandra Broderick is a very pleasant 49-year-old -American lady who presented     with left arm numbness because of which she underwent MRI of the spine. MRI of     the spine was abnormal with degenerative changes as well as abnormal signal     concerning for primary bone marrow problem. Then patient underwent further     workup including serum protein electrophoresis and was found to have monoclonal     IgG kappa band in the gamma region. Her monoclonal spike was 0.7 g because of     which she is now being referred to hematology.            HPI - Oncology Interim      Patient returns today for follow-up of monoclonal gammopathy.  She states she     has been doing well since her last visit.  She is in the midst of transitioning     jobs which has been a little stressful.  She denies fever, chills, weight loss,     night sweats or lymphadenopathy.  In fact, she notes she has gained a few     pounds.  She is not exercising quite as routinely.  She denies any unusual aches    or pains.  No new masses or lymphadenopathy.  She denies excessive bruising or     bleeding.  She reports normal bladder and bowel habits.            Clinical Trial Participant      No            ECOG Performance Status      0            Most Recent Lab Findings            Item Value  Date Time             Immunoglobulin G 1414 mg/dL 20 9098              Immunoglobulin A 336 mg/dL 20 1557             Immunoglobulin M 93 mg/dL 20 1557             M-Tom (NIXON) 0.4 g/dL H 20 1557             Free Kappa Light Chains 19.3 mg/L 20 1557             Free Lambda Light Chains 10.1 mg/L 20 1557             Free Kappa/Lambda Light Chain Ratio 1.91 NA H 20 1557            Laboratory Tests      20 15:57            Most Recent Imaging Findings      Patient: PAULETTE BRODERICK   Acct: #[id number]   Report: #[id number]            UNIT #: [id number]    DOS: 20 1328      INSURANCE:[employer]   ORDER #:[id number]      LOCATION:[facility]     : 1970            PROVIDERS      ADMITTING:     ATTENDING: TRACY IRIZARRY      FAMILY:  NONE,MD   ORDERING:  TRACY IRIZARRY         OTHER:    DICTATING:  Albino Cardenas MD            REQ #:[id number]   EXAM:BONESVA - BONE SURVEY COMPLETE      REASON FOR EXAM:        REASON FOR VISIT:  MONOCLONAL GAMMOPATHY            *******Signed******         PROCEDURE:   BONE SURVEY             COMPARISON:   [facility], CR, BONE SURVEY, 2019,     8:27.  [facility], CR, LS-SPINE COMPL W/OBLIQUE, 2019, 7:46.             INDICATIONS:   MONOCLONAL GAMMOPATHY OF UNKNOWN SIGNIFICANCE.             FINDINGS:         Skeletal survey was performed and compared directly to the prior skeletal     survey.  There are       postoperative changes identified of prior cervical fusion and postoperative     changes are identified       in the mandible.  There is thoracic and lumbar spondylosis.  No lytic or blastic    bone lesions are       identified.  No fractures seen.             CONCLUSION:         1. Negative bones series .  No evidence of lytic or blastic bone lesions.      2. Postop changes of prior cervical fusion as well as prior mandibular surgery.      3. Thoracic cervical and lumbar spondylosis              Albino Cardenas MD              Electronically Signed and Approved By: Albino Cardenas MD on 9/14/2020 at 14:13                                  Until signed, this is an unconfirmed preliminary report that may contain      errors and is subject to change.                                              STEPHEN:      D:09/14/20 1413            PAST, FAMILY   Past Medical History      Past Medical History:  Hypertension      Other PMH:        ELEV. M SPIKE      Hematology/Oncology (F):  Breast Cancer      Other Hematology History:        ELEV. M SPIKE            Past Surgical History      Hysterectomy            BREAST REDUCTION, HERNIA REPAIR, CERVIX            Family History      Family History:  Breast Cancer            Social History      Lives independently:  Yes      Number of Children:  1      Occupation:  SOLDIER            Tobacco Use      Tobacco status:  Never smoker            Substance Use      Substance use:  Denies use            REVIEW OF SYSTEMS      General:  Denies: Fatigue      Cardiovascular:  Denies Palpitations      Respiratory:  Denies: Productive Cough, Shortness of Air      Gastrointestinal:  Denies Diarrhea, Denies Problem Swallowing      Genitourinary:  Denies Painful Urination      Integumentary:  Denies Lesions, Denies Rash      Hematologic/Lymphatic:  Denies Enlarged Lymph Nodes            VITAL SIGNS AND SCORES      Vitals      Weight 222 lbs 0.052 oz / 100.7 kg      Temperature 97.1 F / 36.17 C - Temporal      Pulse 79      Respirations 16      Blood Pressure 154/68 Sitting, Left Arm      Pulse Oximetry 98%, rm air            Pain Score      Pain Scale Used:  Numerical      Pain Intensity:  0            Fatigue Score      Fatigue (0-10 scale):  0 (none)            EXAM      General Appearance:  Positive for: Alert, Cooperative;          Negative for: Acute Distress      Eye:  Positive for: Anicteric Sclerae, Moist Conjunctiva      Neck:  Positive for: Supple;          Negative for: JVD, Masses      Respiratory:  Positive  for: CTAB, Normal Respiratory Effort      Abdomen/Gastro:  Positive for: Normal Active Bowel Sounds, Soft;          Negative for: Distention, Hepatosplenomegaly, Tenderness      Cardiovascular:  Positive for: RRR;          Negative for: Gallop, Murmur, Peripheral Edema, Rub      Psychiatric:  Positive for: Appropriate Affect, Intact Judgement      Lymphatic:  Negative for: Cervical, Infraclavicular, Supraclavicular            PREVENTION      Date Influenza Vaccine Given:  Oct 1, 2018      Influenza Vaccine Declined:  No      2 or More Falls in Past Year?:  No      Fall Past Year with Injury?:  No      Encouraged to follow-up with:  PCP regarding preventative exams.      Chart initiated by      mick guerrero ma            ALLERGY/MEDS      Allergies      Coded Allergies:             NO KNOWN ALLERGIES (Unverified , 9/15/20)            Medications      Last Reconciled on 9/15/20 08:25 by TRACY IRIZARRY      Telmisartan/Hydrochlorothiazid (Micardis HCT 80-12.5 MG) 1 Each Tablet      1 TAB PO QDAY, TAB         Reported         9/18/19       DULoxetine (Cymbalta) 30 Mg Capsule.dr      60 MG PO HS, #60 CAP 0 Refills         Reported         9/18/19       (Hair Skin Nail )   No Conflict Check      1 TAB PO QDAY         Reported         8/1/18       Multivitamin/Iron/Folic Acid (CENTRUM COMPLETE MULTIVIT TAB) 1 Tab Tablet      1 TAB PO QDAY, #30 TAB 0 Refills         Reported         8/1/18       Cyanocobalamin (Vitamin B-12) 5,000 Mcg Tab.subl      5000 MCG SL QDAY, TAB         Reported         8/1/18       Cholecalciferol (Vitamin D3) (Vitamin D3) 5,000 Unit Capsule      5000 UNITS PO QDAY for 30 Days, #30 CAP         Reported         8/1/18       Ubidecarenone (Co Q-10) 1 Each Capsule      100 MG PO QDAY, CAP         Reported         8/1/18       Folic Acid/Vit Bcomp,C (Super B-Complex Folic-Vit C Tb) 400 Mcg Tablet      1 TAB PO QDAY, TAB         Reported         8/1/18       Ferrous Sulfate (Iron Sulfate*) 325 Mg  Tablet      325 MG PO QDAY, #30 TAB 0 Refills         Reported         8/1/18       Calcium/Vitamin D (Caltrate-D) 1 Each Tablet      1 EACH PO QDAY, #30 TAB         Reported         8/1/18       amLODIPine (amLODIPine) 10 Mg Tablet      10 MG PO QDAY, #30 TAB 0 Refills         Reported         8/1/18      Medications Reviewed:  No Changes made to meds            IMPRESSION/PLAN      Diagnosis      MGUS (monoclonal gammopathy of unknown significance) - D47.2            Notes      Patient is doing well.  She has no clinical symptoms.  Reviewing her lab work     with her today, she has normal CBC, creatinine, calcium, liver functions.  SPEP     does continue to demonstrate M spike of 0.4 g/dL which is unchanged over the     past 2 years.  Her free light chain ratio is mildly elevated but improved.      Skeletal survey also reviewed with patient today.  No evidence of osseous     disease.  Continued surveillance is appropriate.  RTC 6 months for that purpose     with labs prior.  Recommended flu shot this fall.      New Diagnostics      * CBC, 6 Months         Dx: MGUS (monoclonal gammopathy of unknown significance) - D47.2      * Comp Metabolic Panel, 6 Months         Dx: MGUS (monoclonal gammopathy of unknown significance) - D47.2      * IMMUNOGLOBULIN QUANT IGAMQ, 6 Months         Dx: MGUS (monoclonal gammopathy of unknown significance) - D47.2      * FREE KAPPA LAMBDA LT CHAINS QU, 6 Months         Dx: MGUS (monoclonal gammopathy of unknown significance) - D47.2      * LDH, 6 Months         Dx: MGUS (monoclonal gammopathy of unknown significance) - D47.2      * SPEP with Immuno (IEPS), 6 Months         Dx: MGUS (monoclonal gammopathy of unknown significance) - D47.2            Patient Education            Eat Well, Exercise Well, Be Well: Dietary and Fitness Guidelines      Influenza Vaccine      Patient Education Provided:  Yes            Electronically signed by TRACY IRIZARRY  09/15/2020 08:25       Disclaimer:  Converted document may not contain table formatting or lab diagrams. Please see [employer] for the authenticated document.

## 2021-05-28 NOTE — PROGRESS NOTES
Patient: PAULETTE BRODERICK     Acct: [id number]     Report: #[id number]  UNIT #: [id number]     : 1970    Encounter Date:2020  PRIMARY CARE: ELIZABETH GOMEZ  ***Signed***  --------------------------------------------------------------------------------------------------------------------  NURSE INTAKE      Visit Type      Established Patient Visit            Chief Complaint      FOLLOW UP ON MGUS            Referring Provider/Copies To      Referring Provider:  LOLA RAMIRES      Primary Care Provider:  ELIZABETH GOMEZ      Copies To:   ELIZABETH GOMEZ            History and Present Illness      Past Oncology Illness History      Mrs. Broderick is a very pleasant 47-year-old -American lady who presented     with left arm numbness because of which she underwent MRI of the spine. MRI of     the spine was abnormal with degenerative changes as well as abnormal signal     concerning for primary bone marrow problem. Then patient underwent further     workup including serum protein electrophoresis and was found to have monoclonal     IgG kappa band in the gamma region. Her monoclonal spike was 0.7 g because of     which she is now being referred to hematology. Patient denies any family history    of multiple myeloma. Patient has already seen neurologist and is considering     spinal surgery for degenerative disease.      Gammaglobulin was 1.5 g and monoclonal spike was 0.7 g in (IgG kappa ).            HPI - Oncology Interim      Patient returns today for follow-up of MGUS.  She states she has been doing well    since her last visit.  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  She denies any unusual aches or pains.  She denies lymphadenopathy.     She denies fever, chills, weight loss, night sweats.  She reports excellent     energy level, adequate for all of her daily needs.            Most Recent Lab Findings      Laboratory Tests      3/11/20 09:03            3/11/20 11:06             PAST, FAMILY   Past Medical History      Past Medical History:  Hypertension      Other PMH:        ELEV. M SPIKE      Hematology/Oncology (F):  Breast Cancer      Other Hematology History:        ELEV. M SPIKE            Past Surgical History      Hysterectomy            BREAST REDUCTION, HERNIA REPAIR, CERVIX            Family History      Family History:  Breast Cancer (MATERNAL AUNT)            Social History      Marital Status:        Lives independently:  Yes      Number of Children:  1      Occupation:  SOLDIER            Tobacco Use      Tobacco status:  Never smoker            Alcohol Use      Alcohol intake:  None            Substance Use      Substance use:  Denies use            REVIEW OF SYSTEMS      General:  Denies: Appetite Change      ENT:  Denies Headache      Cardiovascular:  Denies Chest Pain      Respiratory:  Denies: Coughing Blood, Shortness of Air      Gastrointestinal:  Denies Constipation, Denies Diarrhea      Genitourinary:  Denies Incontinence      Integumentary:  Denies Itching, Denies Rash            VITAL SIGNS AND SCORES      Vitals      Weight 215 lbs 9.758 oz / 97.8 kg      Temperature 97.8 F / 36.56 C - Temporal      Pulse 68      Respirations 20      Blood Pressure 137/80 Sitting, Left Arm      Pulse Oximetry 100%, ROOM AIR            Pain Score      Experiencing any pain?:  No      Pain Scale Used:  Numerical      Pain Intensity:  0            Fatigue Score      Experiencing any fatigue?:  No      Fatigue (0-10 scale):  0 (none)            EXAM      General Appearance:  Positive for: Alert, Cooperative;          Negative for: Acute Distress      Neck:  Positive for: Supple;          Negative for: JVD, Masses      Respiratory:  Positive for: CTAB, Normal Respiratory Effort      Abdomen/Gastro:  Positive for: Normal Active Bowel Sounds, Soft;          Negative for: Distention, Hepatosplenomegaly, Tenderness      Cardiovascular:  Positive for: RRR;          Negative  for: Gallop, Murmur, Peripheral Edema, Rub      Psychiatric:  Positive for: Appropriate Affect, Intact Judgement      Lymphatic:  Negative for: Axillary, Cervical, Infraclavicular, Supraclavicular            PREVENTION      Hx Influenza Vaccination:  Yes      Date Influenza Vaccine Given:  Oct 1, 2018      Influenza Vaccine Declined:  No      2 or More Falls Past Year?:  No      Fall Past Year with Injury?:  No      Hx Pneumococcal Vaccination:  No      Encouraged to follow-up with:  PCP regarding preventative exams.      Chart initiated by      JORGE ALBERTO WILLOUGHBY CMA            ALLERGY/MEDS      Allergies      Coded Allergies:             NO KNOWN ALLERGIES (Unverified , 3/18/20)            Medications      Last Reconciled on 3/18/20 13:01 by TRACY IRIZARRY      Telmisartan/Hydrochlorothiazid (Micardis HCT 80-12.5 MG) 1 Each Tablet      1 TAB PO QDAY, TAB         Reported         9/18/19       DULoxetine (Cymbalta) 30 Mg Capsule.dr      60 MG PO HS, #60 CAP 0 Refills         Reported         9/18/19       (Hair Skin Nail )   No Conflict Check      1 TAB PO QDAY         Reported         8/1/18       Multivitamin/Iron/Folic Acid (CENTRUM COMPLETE MULTIVIT TAB) 1 Tab Tablet      1 TAB PO QDAY, #30 TAB 0 Refills         Reported         8/1/18       Cyanocobalamin (Vitamin B-12) 5,000 Mcg Tab.subl      5000 MCG SL QDAY, TAB         Reported         8/1/18       Cholecalciferol (Vitamin D3) (Vitamin D3) 5,000 Unit Capsule      5000 UNITS PO QDAY for 30 Days, #30 CAP         Reported         8/1/18       Ubidecarenone (Co Q-10) 1 Each Capsule      100 MG PO QDAY, CAP         Reported         8/1/18       Folic Acid/Vit Bcomp,C (Super B-Complex Folic-Vit C Tb) 400 Mcg Tablet      1 TAB PO QDAY, TAB         Reported         8/1/18       Ferrous Sulfate (Iron Sulfate*) 325 Mg Tablet      325 MG PO QDAY, #30 TAB 0 Refills         Reported         8/1/18       Calcium/Vitamin D (Caltrate-D) 1 Each Tablet      1 EACH PO QDAY, #30  TAB         Reported         8/1/18       amLODIPine (amLODIPine) 10 Mg Tablet      10 MG PO QDAY, #30 TAB 0 Refills         Reported         8/1/18      Medications Reviewed:  No Changes made to meds            IMPRESSION/PLAN      Diagnosis      MGUS (monoclonal gammopathy of unknown significance) - D47.2      Patient's M spike remains stable at 0.4 g/dL.  It has been at level for more     than 2 years.  Other lab work looks good.  Plan to recheck in 6 months with labs    and skeletal survey prior.            Notes      New Diagnostics      * CBC, 6 Months         Dx: MGUS (monoclonal gammopathy of unknown significance) - D47.2      * Comp Metabolic Panel, 6 Months      * IMMUNOGLOBULIN QUANT IGAMQ, 6 Months      * FREE KAPPA LAMBDA LT CHAINS QU, 6 Months      * LDH, 6 Months      * SPEP with Immuno (IEPS), 6 Months      * Skeletal Survey Adult, 6 Months            Patient Education      Patient Education Provided:  Yes            Electronically signed by TRACY IRIZARRY  03/18/2020 13:01       Disclaimer: Converted document may not contain table formatting or lab diagrams. Please see [employer] for the authenticated document.

## 2021-05-28 NOTE — PROGRESS NOTES
Patient: PAULETTE BRODERICK     Acct: [id number]     Report: #[id number]  UNIT #: [id number]     : 1970    Encounter Date:12/10/2018  PRIMARY CARE: ELIZABETH GOMEZ  ***Signed***  --------------------------------------------------------------------------------------------------------------------  NURSE INTAKE      Visit Type      Established Patient Visit            Chief Complaint      MGUS            Referring Provider/Copies To      Referring Provider:  LOLA RAMIRES            History and Present Illness      Past History      Mrs. Broderick is a very pleasant 47-year-old -American lady who presented     with left arm numbness because of which she underwent MRI of the spine. MRI of     the spine was abnormal with degenerative changes as well as abnormal signal     concerning for primary bone marrow problem. Then patient underwent further     workup including serum protein electrophoresis and was found to have monoclonal     IgG kappa band in the gamma region. Her monoclonal spike was 0.7 g because of     which she is now being referred to hematology. Patient denies any family history    of multiple myeloma. Patient has already seen neurologist and is considering     spinal surgery for degenerative disease.      Gammaglobulin was 1.5 g and monoclonal spike was 0.7 g in (IgG kappa ).            HPI - Hematology Interim      Pt returns to clinic for f/u-6mo--doing well-states inc fatigue; however, feels     r/t recent dx of menopause and recent cervical surgery in Aug 2018.      Unfortunately she continues to experience neuropathy in LUE.  She denies acute     bone pain.  She did have skeletal survey in 2018-normal.  No lab work since     that time.  Denies fever, lymphadenopathy or distress at this time.            PAST, FAMILY   Past Medical History      Past Medical History:  Hypertension      Other PMH      ELEVATED M SPIKE      Hematology/Oncology (F):  Breast Cancer            Past Surgical History       THROAT SURGERY            Family History      Family History:  Breast Cancer (MATERNAL AUNT)            Social History      Lives independently:  Yes            Tobacco Use      Tobacco status:  Never smoker            Alcohol Use      Alcohol intake:  None            Substance Use      Substance use:  Denies use            REVIEW OF SYSTEMS      General:  Admits: Fatigue;          Denies: Appetite Change, Fever, Night Sweats, Weight Gain, Weight Loss      Eye:  Denies: Blurred Vision, Corrective Lenses, Diplopia, Eye Irritation, Eye     Pain, Eye Redness, Spots in Vision, Vision Loss      ENT:  Denies: Headache, Hearing Loss, Hoarseness, Seizures, Sinus Congestion,     Sore Throat      Cardiovascular:  Denies: Chest Pain, Edema Ankles, Edema Legs, Irregular     Heartbeat, Palpitations      Respiratory:  Denies: Coughing Blood, Productive Cough, Shortness of Air,     Wheezing      Gastrointestinal:  Denies: Bloody Stools, Constipation, Diarrhea, Frequent     Heartburn, Nausea, Problem Swallowing, Tarry Stools, Unable to Control Bowels,     Vomiting      Genitourinary (female):  Denies: Blood in Urine, Decrease Urine Stream, Frequent    Urination, Incontinence, Painful Urination      Musculoskeletal:  Denies: Back Pain, Leg Cramps, Muscle Pain, Muscle Weakness,     Painful Joints, Swollen Joints      Integumentary:  Denies: Bleeds Easily, Bruises Easily, Hair Changes, Jaundice,     Lesions, Mole Changes, Nail Changes, Pigment Changes, Rash, Skin Discoloration      Neurologic:  Denies: Dizziness, Fainting, Numbness\Tingling, Paralysis, Seizures      Psychiatric:  Denies: Anxiety, Confused, Depression, Disoriented, Memory Loss      Endocrine:  Denies: Cold Intolerance, Diabetes, Excessive Sweating, Excessive     Thirst, Excessive Urination, Heat Intolerance, Flushing, Hyperthyroidism,     Hypothyroidism      Reproductive:  Admits: Menopause;          Denies: Heavy Periods, Pregnant, Still Menstruating             VITAL SIGNS AND SCORES      Vitals      Height 5 ft 8.31 in / 173.5 cm      Weight 195 lbs 5.241 oz / 88.6 kg      BSA 2.03 m2      BMI 29.4 kg/m2      Temperature 97.9 F / 36.61 C - Temporal      Pulse 71      Respirations 16      Blood Pressure 152/92 Sitting, Left Arm      Pulse Oximetry 100%, RM AIR            Pain Score      Pain Scale Used:  Numerical      Pain Intensity:  0            Fatigue Score      Fatigue (0-10 scale):  8            EXAM      General Appearance:  Positive for: Alert, Oriented x3, Cooperative;          Negative for: Acute Distress      Neck:  Positive for: Supple;          Negative for: JVD, Masses      Respiratory:  Positive for: CTAB, Normal Respiratory Effort      Abdomen/Gastro:  Positive for: Normal Active Bowel Sounds, Soft;          Negative for: Distention, Hepatosplenomegaly, Tenderness      Cardiovascular:  Positive for: RRR;          Negative for: Gallop, Murmur, Peripheral Edema, Rub      Psychiatric:  Positive for: Appropriate Affect, Intact Judgement      Lower Extremities:  Negative for: Edema      Upper Extremities:  Negative for: Clubbing, Digital Cyanosis, Digital Ischemia      Lymphatic:  Negative for: Axillary, Cervical, Infraclavicular, Supraclavicular            PREVENTION      Hx Influenza Vaccination:  Yes      Date Influenza Vaccine Given:  Oct 1, 2018      Influenza Vaccine Declined:  No      2 or More Falls Past Year?:  No      Fall Past Year with Injury?:  No      Hx Pneumococcal Vaccination:  Yes      Encouraged to follow-up with:  PCP regarding preventative exams.      Chart initiated by      SUREKHA MARIO MA            ALLERGY/MEDS      Allergies      Coded Allergies:             NO KNOWN ALLERGIES (Unverified , 12/10/18)            Medications      Last Reconciled on 12/10/18 08:42 by MARIA ELENA OLIVEIRA      oxyCODONE/Acetaminophen 5/325 MG (oxyCODONE/Acetaminophen 5/325 MG) 1 Each     Tablet      1-2 TAB PO Q4H PRN for PAIN, #36 TAB 0 Refills          Prov: Ronan Vu         8/14/18       (Hair Skin Nail )   No Conflict Check      1 TAB PO QDAY         Reported         8/1/18       Multivitamin/Iron/Folic Acid (CENTRUM COMPLETE MULTIVIT TAB) 1 Tab Tablet      1 TAB PO QDAY, #30 TAB 0 Refills         Reported         8/1/18       Cyanocobalamin (Vitamin B-12) 5,000 Mcg Tab.subl      5000 MCG SL QDAY, TAB         Reported         8/1/18       Cholecalciferol (Vitamin D3) 5,000 Unit Capsule      5000 UNITS PO QDAY for 30 Days, #30 CAP         Reported         8/1/18       Ubidecarenone (Co Q-10) 1 Each Capsule      100 MG PO QDAY, CAP         Reported         8/1/18       Folic Acid/Vit Bcomp,C (Super B-Complex Folic-Vit C Tb) 400 Mcg Tablet      1 TAB PO QDAY, TAB         Reported         8/1/18       Ferrous Sulfate (Iron Sulfate*) 325 Mg Tablet      325 MG PO QDAY, #30 TAB 0 Refills         Reported         8/1/18       Calcium/Vitamin D (Caltrate-D) 1 Each Tablet      1 EACH PO QDAY, #30 TAB         Reported         8/1/18       amLODIPine (amLODIPine) 10 Mg Tablet      10 MG PO QDAY, #30 TAB 0 Refills         Reported         8/1/18       Valsartan/HCTZ (Diovan /25 MG) 1 Each Tablet      1 TAB PO QDAY, TAB         Reported         8/1/18      Medications Reviewed:  No Changes made to meds            IMPRESSION/PLAN      Impression      MGUS            Diagnosis      MGUS (monoclonal gammopathy of unknown significance) - D47.2      Clinically doing well.  She will have lab work today as outlined below.  The     monoclonal protein was quite small.  Monitor for stability.  Recheck in 6 months      New Diagnostics      * CMP Comp Metabolic Panel, Routine      * CBC With Auto Diff, Routine      * IMMUNOGLOBULIN QUANT IGAMQ, Routine      * FREE KAPPA LAMBDA LT CHAINS QU, Routine      * LDH, Routine      * SPEP with Immunofixation, Routine      * IMMUNOFIX PROT ELECTROPH URINE, Routine            Patient Education      Patient Education Provided:  Yes             Topics Patient Counseled on      MGUS/surveillance/risk of transition to Multiple myeloma                 Disclaimer: Converted document may not contain table formatting or lab diagrams. Please see [employer] [facility] for the authenticated document.

## 2022-01-06 ENCOUNTER — OFFICE VISIT (OUTPATIENT)
Dept: NEUROLOGY | Age: 52
End: 2022-01-06
Payer: OTHER GOVERNMENT

## 2022-01-06 VITALS
HEIGHT: 67 IN | RESPIRATION RATE: 18 BRPM | HEART RATE: 77 BPM | SYSTOLIC BLOOD PRESSURE: 132 MMHG | BODY MASS INDEX: 35.31 KG/M2 | WEIGHT: 225 LBS | DIASTOLIC BLOOD PRESSURE: 80 MMHG | OXYGEN SATURATION: 98 %

## 2022-01-06 DIAGNOSIS — R20.0 BILATERAL HAND NUMBNESS: Primary | ICD-10-CM

## 2022-01-06 PROCEDURE — 99204 OFFICE O/P NEW MOD 45 MIN: CPT | Performed by: PSYCHIATRY & NEUROLOGY

## 2022-01-06 RX ORDER — DULOXETIN HYDROCHLORIDE 60 MG/1
60 CAPSULE, DELAYED RELEASE ORAL DAILY
COMMUNITY
End: 2022-01-06

## 2022-01-06 RX ORDER — AMLODIPINE BESYLATE 10 MG/1
TABLET ORAL DAILY
COMMUNITY

## 2022-01-06 RX ORDER — GABAPENTIN 300 MG/1
300 CAPSULE ORAL 3 TIMES DAILY
COMMUNITY

## 2022-01-06 RX ORDER — TELMISARTAN AND HYDROCHLORTHIAZIDE 80; 25 MG/1; MG/1
1 TABLET ORAL DAILY
COMMUNITY

## 2022-01-06 NOTE — PROGRESS NOTES
NEUROLOGY  NEW PATIENT EVALUATION/CONSULTATION       PATIENT NAME: Maday Fitzpatrick    MRN: [id number]    REASON FOR CONSULTATION: Hand numbness    01/06/22      Previous records (physician notes, laboratory reports, and radiology reports) and imaging studies were reviewed and summarized. My recommendations will be communicated back to the patient's physician(s) via electronic medical record and/or by [facility] [facility] mail. HISTORY OF PRESENT ILLNESS:  Maday Fitzpatrick is a 45 y.o. right handed female presenting for evaluation of numbness into the hands. Symptoms started in the left hand approximately 7 years ago involving the entire hand/arm at times. She noted worsening symptoms with arms outstretched in front of her. If she sleeps on the left side, she has also noted worsening symptoms. She denies significant neck pain or radicular symptoms involving the UE. She underwent EMG 2018 which did not reveal any abnormalities per patient. Subsequent MRI Cervical spine revealed bulging disc per patient s/p ACDF 8/2018. More recently over the past year, she has noted numbness into all digits b/l upon awakening. This resolves after several minutes of moving her fingers. She endorses left upper extremity weakness since her ACDF, unchanged. No paresthesias of the lower extremities. Denies bowel/bladder abnormalities. She is taking gabapentin currently for neuropathic pain symptoms (300mg TID) prescribed by her pain management team for other chronic pain symptoms.       PAST MEDICAL HISTORY:  Past Medical History:   Diagnosis Date    Arthritis        PAST SURGICAL HISTORY:  Past Surgical History:   Procedure Laterality Date    HX BREAST REDUCTION      HX HYSTERECTOMY         FAMILY HISTORY:   Family History   Problem Relation Age of Onset    Hypertension Mother     Diabetes Mother     Hypertension Father     Diabetes Father          SOCIAL HISTORY:  Social History     Socioeconomic History    Marital status:  MEDICATIONS:   Current Outpatient Medications   Medication Sig Dispense Refill    gabapentin (NEURONTIN) 300 mg capsule Take 300 mg by mouth three (3) times daily.  telmisartan-hydroCHLOROthiazide (Micardis HCT) 80-25 mg per tablet Take 1 Tablet by mouth daily.  amLODIPine (Norvasc) 10 mg tablet Take  by mouth daily. ALLERGIES:  No Known Allergies      REVIEW OF SYSTEMS:  10 point ROS reviewed with patient. Please see scanned document under media. PHYSICAL EXAM:  Vital Signs:   Visit Vitals  /80   Pulse 77   Resp 18   Ht 5' 7\" (1.702 m)   Wt 225 lb (102.1 kg)   SpO2 98%   BMI 35.24 kg/m²        General Medical Exam:  General:  Well appearing, comfortable, in no apparent distress. Eyes/ENT: see cranial nerve examination. Neck: No masses appreciated. Full range of motion without tenderness. Respiratory:  Clear to auscultation, good air entry bilaterally. Cardiac:  Regular rate and rhythm, no murmur. GI:  Soft, non-tender, non-distended abdomen. Bowel sounds normal. No masses, organomegaly. Extremities:  No deformities, edema, or skin discoloration. Skin:  No rashes or lesions. Neurological:  · Mental Status:  Alert and oriented to person, place, and time with fluent speech. · Cranial Nerves:   CNII/III/IV/VI: visual fields full to confrontation, EOMI, PERRL, no ptosis or nystagmus. CN V: Facial sensation intact bilaterally, masseter 5/5   CN VII: Facial muscles symmetric and strong   CN VIII: Hears finger rub well bilaterally, intact vestibular function   CN IX/X: Normal palatal movement   CN XI: Full strength shoulder shrug bilaterally   CN XII: Tongue protrusion full and midline without fasciculation or atrophy  · Motor: Normal tone and muscle bulk with no pronator drift.    Individual muscle group testing:  Shoulder abduction:   Left:5/5   Right : 5/5    Shoulder adduction:   Left:5/5   Right : 5/5    Elbow flexion:      Left:5/5   Right : 5/5  Elbow extension:    Left:5/5   Right : 5/5   Wrist flexion:    Left:5/5   Right : 5/5  Wrist extension:    Left:5/5   Right : 5/5  Arm pronation:   Left:5/5   Right : 5/5  Arm supination:   Left:5/5   Right : 5/5    Finger flexion:    Left:5/5   Right : 5/5    Finger extension:   Left:5/5   Right : 5/5   Finger abduction:  Left:5/5   Right : 5/5   Finger adduction:   Left:5/5   Right : 5/5  Hip flexion:     Left:5/5   Right : 5/5         Hip extension:   Left:5/5   Right : 5/5    Knee flexion:    Left:5/5   Right : 5/5    Knee extension:   Left:5/5   Right : 5/5    Dorsiflexion:     Left:5/5   Right : 5/5  Plantar flexion:    Left:5/5   Right : 5/5      · MSRs: No crossed adductors or clonus. RIGHT  LEFT   Brachioradialis 2+ 2+   Biceps 2+ 2+   Triceps 2+ 2+   Knee 2+ 2+   Achilles 2+ 2+        Plantar response Downward Downward          · Sensation: Normal and symmetric perception of pinprick, temperature, light touch, proprioception, and vibration; (-) Romberg. (-)Tinel's wrists b/l. · Coordination: No dysmetria. Normal rapid alternating movements; finger-to-nose and heel-to- shin testing are within normal limits. · Gait: Normal native gait    ASSESSMENT:      ICD-10-CM ICD-9-CM    1. Bilateral hand numbness  R20.0 782. 0 EMG LIMITED      MRI CERV SPINE WO CONT   45year old AAF with a h/o ACDF presenting with b/l hand numbness x 1 year. Will proceed with electrodiagnostic testing for further evaluation of potential mononeuropathy/CTS versus cervical radiculopathy. Will also obtain updated cervical imaging to assess for any significant canal/neuroforaminal stenosis contributing to above presentation. Results to be discussed after her procedure. PLAN:  · EMG b/l UE  · MRI Cervical WO      I have discussed the diagnosis with the patient today and the intended plan as seen in the above orders with both the patient as well as referring provider and/or PCP via electronic correspondence.  The patient has received an after-visit summary and questions were answered concerning future plans. Jossue Cam DO  Staff Neurologist  Diplomate, American Board of Psychiatry & Neurology       CC Referring provider:  Estefania Callejas NP

## 2022-01-06 NOTE — LETTER
1/6/2022    Patient: Alfredo Bangura   YOB: 1970   Date of Visit: 1/6/2022     Rosi York NP  [facility] [address] [id number]  Via Fax: [phone]    Dear Rosi York NP,      Thank you for referring Ms. Alfredo Bangura to [address] [place] [address] for evaluation. My notes for this consultation are attached. If you have questions, please do not hesitate to call me. I look forward to following your patient along with you.       Sincerely,    Jazzmine Helm, DO

## 2022-03-16 ENCOUNTER — OFFICE VISIT (OUTPATIENT)
Dept: NEUROLOGY | Age: 52
End: 2022-03-16
Payer: OTHER GOVERNMENT

## 2022-03-16 DIAGNOSIS — G56.01 CARPAL TUNNEL SYNDROME OF RIGHT WRIST: Primary | ICD-10-CM

## 2022-03-16 PROCEDURE — 95911 NRV CNDJ TEST 9-10 STUDIES: CPT | Performed by: PSYCHIATRY & NEUROLOGY

## 2022-03-16 PROCEDURE — 95886 MUSC TEST DONE W/N TEST COMP: CPT | Performed by: PSYCHIATRY & NEUROLOGY

## 2022-03-16 NOTE — PROGRESS NOTES
[facility] Neurology [facility]  [address] [facility], [address] [place] [address]  Phone [phone] Fax [phone]  Test Date:  3/16/2022    Patient: Evelyn Vila : 1970 Physician: Sharmila Cisneros. Judd Alex DO   Sex: Female Height: 5' 7\" Ref Phys: Sharmila Cisneros. Judd Alex DO   ID#: [id number]  Weight: 225 lbs. Technician: Melinda Umanzor     Patient Complaints:  Bilateral upper extremity paresthesias    NCV & EMG Findings:  Evaluation of the right median sensory nerve showed prolonged distal peak latency (3.8 ms) and decreased conduction velocity (Wrist-2nd Digit, 37 m/s). All remaining nerves  were within normal limits. All F Wave latencies were within normal limits. All examined muscles (as indicated in the following table) showed no evidence of electrical instability. Impression:  Extensive electrodiagnostic examination of the upper extremities bilaterally reveals changes most consistent with the followin. A right median neuropathy at or distal to the wrist, consistent with a clinical diagnosis of carpal tunnel syndrome, mild in degree electrically. 2. No evidence of a right or left cervical motor radiculopathy. ___________________________  Jared Alex DO        Nerve Conduction Studies  Anti Sensory Summary Table     Stim Site NR Peak (ms) Norm Peak (ms) P-T Amp (µV) Norm P-T Amp Onset (ms) Site1 Site2 Delta-P (ms) Dist (cm) Giovani (m/s) Norm Giovani (m/s)   Left Median Anti Sensory (2nd Digit)  32.5°C   Wrist    3.0 <3.6 39.2 >10 2.3 Wrist 2nd Digit 3.0 14.0 47 >39   Right Median Anti Sensory (2nd Digit)  33.4°C   Wrist    3.8 <3.6 29.1 >10 3.2 Wrist 2nd Digit 3.8 14.0 37 >39   Left Radial Anti Sensory (Base 1st Digit)  33.9°C   Wrist    2.2 <3.1 39.9  1.7 Wrist Base 1st Digit 2.2 0.0     Right Radial Anti Sensory (Base 1st Digit)  33.7°C   Wrist    1.8 <3.1 78.8  1.3 Wrist Base 1st Digit 1.8 0.0     Left Ulnar Anti Sensory (5th Digit)  32.7°C   Wrist    2.9 <3.7 63.2 >15.0 2.3 Wrist 5th Digit 2.9 14.0 48 >38   Right Ulnar Anti Sensory (5th Digit)  33.6°C   Wrist    2.7 <3.7 39.4 >15.0 2.2 Wrist 5th Digit 2.7 14.0 52 >38     Motor Summary Table     Stim Site NR Onset (ms) Norm Onset (ms) O-P Amp (mV) Norm O-P Amp Site1 Site2 Delta-0 (ms) Dist (cm) Giovani (m/s) Norm Giovani (m/s)   Left Median Motor (Abd Poll Brev)  32.9°C   Wrist    3.3 <4.2 9.9 >5 Elbow Wrist 3.1 20.0 65 >50   Elbow    6.4  9.8          Right Median Motor (Abd Poll Brev)  33.8°C   Wrist    3.9 <4.2 6.4 >5 Elbow Wrist 3.9 22.0 56 >50   Elbow    7.8  5.5          Left Ulnar Motor (Abd Dig Minimi)  34.1°C   Wrist    3.1 <4.2 9.7 >3 B Elbow Wrist 2.5 17.0 68 >53   B Elbow    5.6  9.5  A Elbow B Elbow 1.7 10.0 59 >53   A Elbow    7.3  9.2          Right Ulnar Motor (Abd Dig Minimi)  34.2°C   Wrist    2.9 <4.2 8.7 >3 B Elbow Wrist 2.6 17.0 65 >53   B Elbow    5.5  8.4  A Elbow B Elbow 1.8 10.0 56 >53   A Elbow    7.3  8.1            Comparison Summary Table     Stim Site NR Peak (ms) Norm Peak (ms) P-T Amp (µV) Site1 Site2 Delta-P (ms) Norm Delta (ms)   Left Median/Ulnar Palm Comparison (Wrist - 8cm)  33.6°C   Median Palm    1.9 <2.5 28.2 Median Palm Ulnar Palm 0.3 <0.3   Ulnar Palm    1.6 <2.5 12.1         F Wave Studies     NR F-Lat (ms) Lat Norm (ms) L-R F-Lat (ms) L-R Lat Norm   Left Ulnar (Mrkrs) (Abd Dig Min)  34.2°C      26.01 <36  <2.5     EMG     Side Muscle Nerve Root Ins Act Fibs Psw Amp Dur Poly Recrt Int Pat Comment   Left 1stDorInt Ulnar C8-T1 Nml Nml Nml Nml Nml 0 Nml Nml    Left FlexPolLong Median (Ant Int) C7-8 Nml Nml Nml Nml Nml 0 Nml Nml    Left Ext Indicis Radial (Post Int) C7-8 Nml Nml Nml Nml Nml 0 Nml Nml    Left Biceps Musculocut C5-6 Nml Nml Nml Nml Nml 0 Nml Nml    Left Triceps Radial C6-7-8 Nml Nml Nml Nml Nml 0 Nml Nml    Left Deltoid Axillary C5-6 Nml Nml Nml Nml Nml 0 Nml Nml    Right 1stDorInt Ulnar C8-T1 Nml Nml Nml Nml Nml 0 Nml Nml    Right Abd Poll Brev Median C8-T1 Nml Nml Nml Nml Nml 0 Nml Nml    Right FlexPolLong Median (Ant Int) C7-8 Nml Nml Nml Nml Nml 0 Nml Nml    Right Ext Indicis Radial (Post Int) C7-8 Nml Nml Nml Nml Nml 0 Nml Nml    Right Biceps Musculocut C5-6 Nml Nml Nml Nml Nml 0 Nml Nml    Right Triceps Radial C6-7-8 Nml Nml Nml Nml Nml 0 Nml Nml    Right Deltoid Axillary C5-6 Nml Nml Nml Nml Nml 0 Nml Nml          Waveforms:

## 2022-04-06 ENCOUNTER — HOSPITAL ENCOUNTER (OUTPATIENT)
Dept: MRI IMAGING | Age: 52
Discharge: HOME | End: 2022-04-06
Attending: PSYCHIATRY & NEUROLOGY
Payer: OTHER GOVERNMENT

## 2022-04-06 DIAGNOSIS — R20.0 BILATERAL HAND NUMBNESS: ICD-10-CM

## 2022-04-06 PROCEDURE — 72141 MRI NECK SPINE W/O DYE: CPT

## 2022-04-22 ENCOUNTER — TELEPHONE (OUTPATIENT)
Dept: NEUROLOGY | Age: 52
End: 2022-04-22

## 2022-04-22 NOTE — TELEPHONE ENCOUNTER
Spoke with patient, informed her of recent MRI imaging and to discuss results with neurosurgeon per . She states her surgeon is in [place] and wanted to know if  felt it was necessary to have a referral to neurosurgery to discuss.

## 2022-04-26 ENCOUNTER — OFFICE VISIT (OUTPATIENT)
Dept: ORTHOPEDIC SURGERY | Age: 52
End: 2022-04-26
Payer: OTHER GOVERNMENT

## 2022-04-26 VITALS — BODY MASS INDEX: 33.74 KG/M2 | WEIGHT: 215 LBS | HEIGHT: 67 IN

## 2022-04-26 DIAGNOSIS — M67.442 GANGLION CYST OF JOINT OF FINGER OF LEFT HAND: ICD-10-CM

## 2022-04-26 DIAGNOSIS — M79.645 FINGER PAIN, LEFT: Primary | ICD-10-CM

## 2022-04-26 DIAGNOSIS — M48.02 CERVICAL STENOSIS OF SPINAL CANAL: Primary | ICD-10-CM

## 2022-04-26 DIAGNOSIS — R20.0 BILATERAL HAND NUMBNESS: ICD-10-CM

## 2022-04-26 PROCEDURE — 99203 OFFICE O/P NEW LOW 30 MIN: CPT | Performed by: ORTHOPAEDIC SURGERY

## 2022-04-26 RX ORDER — MELOXICAM 15 MG/1
TABLET ORAL
COMMUNITY
Start: 2022-04-20

## 2022-04-26 NOTE — PROGRESS NOTES
Darryl Henson (: 1970) is a 45 y.o. female patient here for evaluation of the following chief complaint(s):  Finger Pain (left middle finger cyst off and on for several years now. )       ASSESSMENT/PLAN:  Below is the assessment and plan developed based on review of pertinent history, physical exam, labs, studies, and medications. 1. Finger pain, left  -     XR 3RD FINGER LT MIN 2 V; Future  2. Ganglion cyst of joint of finger of left hand      I discussed treatment options with the patient we reviewed operative and nonoperative management. So far the patient has had injections and biopsy by dermatology at one point went away for little bit but then returned. We discussed excision versus observation and she wants to proceed with excision. We discussed risks, benefits and alternatives to surgery. After thorough discussion ultimately the patient elected to proceed with operative intervention. We discussed the risks including but not limited to postoperative pain, swelling, bruising, bleeding, scarring, infection, damage to neurovascular structures. We also discussed permanent or temporary loss of range of motion, need for additional surgery, rejection of foreign material such as metal, suture or other tissue. We discussed risk of blood clots. Plan is for left middle finger mucous cyst excision. Patient verbalized understanding and elected to proceed. All questions were answered to the patient's apparent satisfaction. SUBJECTIVE/OBJECTIVE:  HPI    54-year-old female with left middle finger cyst.  She states its been there for many years. She has been seen by dermatologist previously who biopsied it and took part of it out but then recurred. She states it is painful especially if she bumps it on anything. Patient reports a gradual onset of symptoms. Duration of problem 2 years.   Symptom Severity 6/10  Symptom Frequency constant        No Known Allergies    Current Outpatient Medications   Medication Sig    meloxicam (MOBIC) 15 mg tablet     gabapentin (NEURONTIN) 300 mg capsule Take 300 mg by mouth three (3) times daily.  telmisartan-hydroCHLOROthiazide (Micardis HCT) 80-25 mg per tablet Take 1 Tablet by mouth daily.  amLODIPine (Norvasc) 10 mg tablet Take  by mouth daily. No current facility-administered medications for this visit. Social History     Socioeconomic History    Marital status:      Spouse name: Not on file    Number of children: Not on file    Years of education: Not on file    Highest education level: Not on file   Occupational History    Not on file   Tobacco Use    Smoking status: Never Smoker    Smokeless tobacco: Never Used   Substance and Sexual Activity    Alcohol use: Not on file    Drug use: Not on file    Sexual activity: Not on file   Other Topics Concern    Not on file   Social History Narrative    Not on file     Social Determinants of Health     Financial Resource Strain:     Difficulty of Paying Living Expenses: Not on file   Food Insecurity:     Worried About Running Out of Food in the Last Year: Not on file    Rhett of Food in the Last Year: Not on file   Transportation Needs:     Lack of Transportation (Medical): Not on file    Lack of Transportation (Non-Medical):  Not on file   Physical Activity:     Days of Exercise per Week: Not on file    Minutes of Exercise per Session: Not on file   Stress:     Feeling of Stress : Not on file   Social Connections:     Frequency of Communication with Friends and Family: Not on file    Frequency of Social Gatherings with Friends and Family: Not on file    Attends [employer] Services: Not on file    Active Member of Clubs or Organizations: Not on file    Attends Club or Organization Meetings: Not on file    Marital Status: Not on file   Intimate Partner Violence:     Fear of Current or Ex-Partner: Not on file    Emotionally Abused: Not on file   Tai Aldridge Physically Abused: Not on file    Sexually Abused: Not on file   Housing Stability:     Unable to Pay for Housing in the Last Year: Not on file    Number of Places Lived in the Last Year: Not on file    Unstable Housing in the Last Year: Not on file       Past Surgical History:   Procedure Laterality Date    HX BREAST REDUCTION      HX HYSTERECTOMY         Family History   Problem Relation Age of Onset    Hypertension Mother     Diabetes Mother     Hypertension Father     Diabetes Father             Review of Systems    No flowsheet data found. Vitals:  Ht 5' 7\" (1.702 m)   Wt 215 lb (97.5 kg)   BMI 33.67 kg/m²    Estimated body surface area is 2.15 meters squared as calculated from the following:    Height as of this encounter: 5' 7\" (1.702 m). Weight as of this encounter: 215 lb (97.5 kg). Body mass index is 33.67 kg/m². Physical Exam    Musculoskeletal Exam:    Left Upper Extremity EXAMINATION    Patient has tenderness to palpation at left middle finger DIP joint. There is a 4 mm mass consistent with mucous cyst in this location. She also has changes to her nail bed with ridging noted. She has intact flexion extension of the digit. Patient fires AIN, PIN and ulnar nerves. Sensation is grossly intact in the median, radial and ulnar distribution. Hand is pink and appears well-perfused. Hand is warm. Skin is intact. Compartments are soft and compressible.       Consitutional: Healthy  Skin:   - Edema - none  - Cellulitis - No    Neuro: Numbness or tingling in R/L arm: No    Psych: Affect normal    Cardiovascular: Capillary Refill < 2 seconds in upper extremities    Respiratory: Non-Labored Breathing    ROS:    Constitutional: Denies fever/chills    Respiratory: Denies SOB        Imaging:    XR Results (most recent):  Results from Appointment encounter on 04/26/22    XR 3RD FINGER LT MIN 2 V    Narrative  Left Finger Xray  Indication: pain  Views: 3 views, AP/LAT/OBL    Interpretation: 3 views of the left middle finger are reviewed and shows no evidence of fracture, subluxation or dislocation of the metacarpal phalangeal joint, proximal interphalangeal joint, or distal interphalangeal joints. There is no evidence of soft tissue swelling in the bone architecture appears normal.  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 Orders Placed This Encounter    XR 3RD FINGER LT MIN 2 V     Standing Status:   Future     Number of Occurrences:   1     Standing Expiration Date:   4/27/2023          An electronic signature was used to authenticate this note.   -- Sandro Rodriguez MD

## 2022-04-26 NOTE — TELEPHONE ENCOUNTER
Spoke with patient, informed her per -  I have reviewed the images and would recommend she see Jl Padgett for the cervical stenosis given her b/l hand paresthesias. EMG did show mild right carpal tunnel, however this would not explain her bilateral symptoms. She would like a referral to neurosurgery.

## 2022-04-26 NOTE — PATIENT INSTRUCTIONS
From the [employer]    Ganglion Cyst    What is a Ganglion Cyst?    A ganglion cyst is a lump at the hand and wrist that occurs near joints or tendons. It may be described as a mass, swelling, or bump. Ganglion cysts are common. They are frequently found in common locations and are often seen on the back of the wrist in the middle. They can also be near a finger joint. When they are on the palm of the wrist, they are typically off-center toward the base of the thumb (see Figure 1). They may also be felt at the base of the finger on the palm side. They are sometimes seen on the fingertip (near the end joint) on the back of the finger (see Figure 2). The ganglion cyst may be filled with a clear, gel-like fluid. It can be thought of as a water balloon on a straw (see Figure 3). The straw is connected to a joint or tendon sheath. Joints and tendon sheaths normally produce fluid. This fluid production may cause the cyst to increase or decrease in size. Some cysts may disappear completely over time. A ganglion cyst is not cancerous and will not spread to other areas. Figure 1  A ganglion cyst shown on the top side of the wrist        Figure 2  A ganglion cyst shown at the end joint of the finger, also known as a mucous cyst        Figure 3  Cross-section of wrist showing the root of the ganglion cyst        Causes    A ganglion cyst can occur in patients of all ages. While the cause of ganglion cysts is unknown, the cysts may form in the presence of joint or tendon irritation, arthritis, mechanical changes, or injury. Signs and Symptoms    Your ganglion cyst may or may not be painful. The cysts are typically oval or round and may be soft or very firm. Cysts at the base of the finger on the palm side are typically very firm. They are often smaller than a pea and tender to pressure, such as when gripping something.     Cysts at the end of the finger just below the fingernail are often associated with arthritis. They are called mucous cysts. The chronic pressure from the cyst on the tissue that creates the fingernail can result in a nail depression or groove. This nail groove often goes away when the cyst is treated. The fingertip swelling can be confused with a wart or a nailfold infection. Consider seeing a hand surgeon if initial treatments for a fingertip lump do not seem to be working. With mucous cysts, the overlying skin may become stretched thin. This thin skin may break, causing the thick jelly-like fluid to come out. If this occurs, gently press in the area to get as much fluid out, clean the finger with soap and water, and cover the puncture site until it is fully sealed and healed after a few days. If this skin opening becomes red and painful, it may be infected. Because the skin hole communicates with the joint, it can rapidly destroy the cartilage of a joint. Because the joint may already have arthritis, it becomes urgent to receive treatment for the infection which may require both antibiotics and surgery to wash out the joint infection. Diagnosing a Ganglion Cyst    The diagnosis is usually based on the location of the lump and its appearance. Light will often pass through these lumps, and this may assist in the diagnosis. Your hand surgeon may request x-rays in order to look for evidence of problems in adjacent joints. Other imaging tests such as ultrasound can be helpful to diagnose a ganglion cyst because the uniform dark appearance of the fluid in the cyst is often characteristic. They are also visible on MRI (magnetic resonance imaging), which is often helpful when the cyst is suspected but cannot be easily felt during the exam. Your doctor will also ask you questions about your medical history in diagnosing your problem.     Treatment    Non-Surgical    Treatment for a ganglion cyst can often be non-surgical. In many cases, these cysts can simply be observed, especially if they are painless. Ganglion cysts could disappear spontaneously. If the cyst becomes painful, limits activity, or is otherwise causing problems, several treatment options are available. The different types of non-surgical treatment may include:    Observation (doing nothing but keeping your doctor informed if things change)    Splints and anti-inflammatory medication, which can decrease pain associated with certain activities    Aspiration: This is done to remove fluid from the cyst and decompress it. This requires placing a needle into the cyst, which can be performed in most office settings. Sometimes the fluid is so thick it doesnt fill the syringe. Pressure can be applied to the cyst to force the fluid out of the cyst into the surrounding tissue under the skin. Aspiration can be done with or without ultrasound guidance. Recurrence of the cyst is common since the cyst wall, or sac, and stalk remain connected to the joint. Surgical    If non-surgical options fail to provide relief, or if the cyst reoccurs, surgical alternatives are available. Surgery involves removing the cyst wall, fluid, and stalk. It may include removal of a portion of the joint capsule or tendon sheath. In the case of wrist ganglion cysts, both open and arthroscopic techniques usually yield good results. Surgical treatment is generally successful. Even with surgery the cyst can come back since we do not fully understand what causes them and there are no known preventions. If the ganglion cyst returns and does not hurt, it can be left alone. If it returns and hurts, it can be removed again. Your orthopaedic hand surgeon will discuss the best treatment options for you and give medical advice specific to your condition.

## 2022-05-17 DIAGNOSIS — M67.442 GANGLION CYST OF JOINT OF FINGER OF LEFT HAND: Primary | ICD-10-CM

## 2022-05-17 RX ORDER — HYDROCODONE BITARTRATE AND ACETAMINOPHEN 5; 325 MG/1; MG/1
1 TABLET ORAL
Qty: 10 TABLET | Refills: 0 | Status: SHIPPED | OUTPATIENT
Start: 2022-05-17 | End: 2022-05-22

## 2022-06-02 ENCOUNTER — OFFICE VISIT (OUTPATIENT)
Dept: ORTHOPEDIC SURGERY | Age: 52
End: 2022-06-02
Payer: OTHER GOVERNMENT

## 2022-06-02 VITALS — WEIGHT: 215 LBS | HEIGHT: 67 IN | BODY MASS INDEX: 33.74 KG/M2

## 2022-06-02 DIAGNOSIS — M67.442 GANGLION CYST OF JOINT OF FINGER OF LEFT HAND: Primary | ICD-10-CM

## 2022-06-02 PROCEDURE — 99024 POSTOP FOLLOW-UP VISIT: CPT | Performed by: ORTHOPAEDIC SURGERY

## 2022-06-02 NOTE — PROGRESS NOTES
Miladys Burkett (: 1970) is a 45 y.o. female patient here for evaluation of the following chief complaint(s):  Hand Pain (po left middle finger sx )       ASSESSMENT/PLAN:  Below is the assessment and plan developed based on review of pertinent history, physical exam, labs, studies, and medications. 1. Ganglion cyst of joint of finger of left hand      Patient is now about 2 weeks out from left middle finger mucous cyst excision. Overall she is doing well has some continued pain a little bit of swelling but improving. She says that she is improved a lot over the last couple of days. She will continue to do the daily dressing changes and follow-up in 6 to 8 weeks. Patient verbalized understanding and elected to proceed. All questions were answered to the patient's apparent satisfaction. SUBJECTIVE/OBJECTIVE:    Patient is here today for a postoperative visit. Date of Surgery: 22    Patient reports she is doing well at this time. She has some concern of her wound healing but states it is gotten much better over the last couple of days. No Known Allergies    Current Outpatient Medications   Medication Sig    meloxicam (MOBIC) 15 mg tablet     gabapentin (NEURONTIN) 300 mg capsule Take 300 mg by mouth three (3) times daily.  telmisartan-hydroCHLOROthiazide (Micardis HCT) 80-25 mg per tablet Take 1 Tablet by mouth daily.  amLODIPine (Norvasc) 10 mg tablet Take  by mouth daily. No current facility-administered medications for this visit.        Social History     Socioeconomic History    Marital status:      Spouse name: Not on file    Number of children: Not on file    Years of education: Not on file    Highest education level: Not on file   Occupational History    Not on file   Tobacco Use    Smoking status: Never Smoker    Smokeless tobacco: Never Used   Substance and Sexual Activity    Alcohol use: Not on file    Drug use: Not on file  Sexual activity: Not on file   Other Topics Concern    Not on file   Social History Narrative    Not on file     Social Determinants of Health     Financial Resource Strain:     Difficulty of Paying Living Expenses: Not on file   Food Insecurity:     Worried About Running Out of Food in the Last Year: Not on file    Rhett of Food in the Last Year: Not on file   Transportation Needs:     Lack of Transportation (Medical): Not on file    Lack of Transportation (Non-Medical): Not on file   Physical Activity:     Days of Exercise per Week: Not on file    Minutes of Exercise per Session: Not on file   Stress:     Feeling of Stress : Not on file   Social Connections:     Frequency of Communication with Friends and Family: Not on file    Frequency of Social Gatherings with Friends and Family: Not on file    Attends [employer] Services: Not on file    Active Member of Clubs or Organizations: Not on file    Attends Club or Organization Meetings: Not on file    Marital Status: Not on file   Intimate Partner Violence:     Fear of Current or Ex-Partner: Not on file    Emotionally Abused: Not on file    Physically Abused: Not on file    Sexually Abused: Not on file   Housing Stability:     Unable to Pay for Housing in the Last Year: Not on file    Number of [place] in the Last Year: Not on file    Unstable Housing in the Last Year: Not on file       Past Surgical History:   Procedure Laterality Date    HX BREAST REDUCTION      HX HYSTERECTOMY         Family History   Problem Relation Age of Onset    Hypertension Mother     Diabetes Mother     Hypertension Father     Diabetes Father             Review of Systems    No flowsheet data found. Vitals:  Ht 5' 7\" (1.702 m)   Wt 215 lb (97.5 kg)   BMI 33.67 kg/m²    Estimated body surface area is 2.15 meters squared as calculated from the following:    Height as of this encounter: 5' 7\" (1.702 m).     Weight as of this encounter: 215 lb (97.5 kg). Body mass index is 33.67 kg/m². Physical Exam    Musculoskeletal Exam:    Left Upper Extremity Exam:    Evaluation of the patient's postoperative site shows that the incision is intact and healing appropriately. There are no signs of infection, no redness, no warmth, no erythema. There is no purulent drainage noted. Patient fires AIN, PIN and ulnar nerves. Sensation is grossly intact in the median, radial and ulnar distribution. Hand is pink and appears well-perfused. Hand is warm. Consitutional: Healthy  Skin:   - Edema - mild  - Cellulitis - No    Neuro: Numbness or tingling in R/L arm: none    Psych: Affect normal    Cardiovascular: Capillary Refill < 2 seconds in upper extremities    Respiratory: Non-Labored Breathing      ROS:    Constitutional: Denies fever/chills    Respiratory: Denies SOB        Imaging:    XR Results (most recent):  Results from Appointment encounter on 04/26/22    XR 3RD FINGER LT MIN 2 V    Narrative  Left Finger Xray  Indication: pain  Views: 3 views, AP/LAT/OBL    Interpretation: 3 views of the left middle finger are reviewed and shows no evidence of fracture, subluxation or dislocation of the metacarpal phalangeal joint, proximal interphalangeal joint, or distal interphalangeal joints. There is no evidence of soft tissue swelling in the bone architecture appears normal.  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 No orders of the defined types were placed in this encounter. Procedures:    Sutures removed without difficulty today. An electronic signature was used to authenticate this note.   -- Ronnie Malave MD

## 2022-07-26 ENCOUNTER — OFFICE VISIT (OUTPATIENT)
Dept: ORTHOPEDIC SURGERY | Age: 52
End: 2022-07-26
Payer: OTHER GOVERNMENT

## 2022-07-26 VITALS — HEIGHT: 67 IN | WEIGHT: 215 LBS | BODY MASS INDEX: 33.74 KG/M2

## 2022-07-26 DIAGNOSIS — M67.442 GANGLION CYST OF JOINT OF FINGER OF LEFT HAND: Primary | ICD-10-CM

## 2022-07-26 PROCEDURE — 99024 POSTOP FOLLOW-UP VISIT: CPT | Performed by: ORTHOPAEDIC SURGERY

## 2022-07-26 NOTE — PROGRESS NOTES
Easton German (: 1970) is a 45 y.o. female patient here for evaluation of the following chief complaint(s):  Hand Pain (Left middle finger)       ASSESSMENT/PLAN:  Below is the assessment and plan developed based on review of pertinent history, physical exam, labs, studies, and medications. 1. Ganglion cyst of joint of finger of left hand      Patient is now about 2 months out from left middle finger mucous cyst excision. Incision is healed nicely and pain has improved significantly. Overall doing very well and can follow-up on an as-needed basis. Patient verbalized understanding and elected to proceed. All questions were answered to the patient's apparent satisfaction. SUBJECTIVE/OBJECTIVE:    Patient is here today for a postoperative visit. Date of Surgery: 22    Patient reports she is doing well at this time. She states the wound is now healed and she has good range of motion has some concern of the persistent swelling and occasionally has some pain but otherwise doing well. No Known Allergies    Current Outpatient Medications   Medication Sig    meloxicam (MOBIC) 15 mg tablet     gabapentin (NEURONTIN) 300 mg capsule Take 300 mg by mouth three (3) times daily. telmisartan-hydroCHLOROthiazide (Micardis HCT) 80-25 mg per tablet Take 1 Tablet by mouth daily. amLODIPine (Norvasc) 10 mg tablet Take  by mouth daily. No current facility-administered medications for this visit.        Social History     Socioeconomic History    Marital status:      Spouse name: Not on file    Number of children: Not on file    Years of education: Not on file    Highest education level: Not on file   Occupational History    Not on file   Tobacco Use    Smoking status: Never    Smokeless tobacco: Never   Substance and Sexual Activity    Alcohol use: Not on file    Drug use: Not on file    Sexual activity: Not on file   Other Topics Concern    Not on file   Social History Narrative    Not on file     Social Determinants of Health     Financial Resource Strain: Not on file   Food Insecurity: Not on file   Transportation Needs: Not on file   Physical Activity: Not on file   Stress: Not on file   Social Connections: Not on file   Intimate Partner Violence: Not on file   Housing Stability: Not on file       Past Surgical History:   Procedure Laterality Date    HX BREAST REDUCTION      HX HYSTERECTOMY         Family History   Problem Relation Age of Onset    Hypertension Mother     Diabetes Mother     Hypertension Father     Diabetes Father             Review of Systems    No flowsheet data found. Vitals:  Ht 5' 7\" (1.702 m)   Wt 215 lb (97.5 kg)   BMI 33.67 kg/m²    Estimated body surface area is 2.15 meters squared as calculated from the following:    Height as of this encounter: 5' 7\" (1.702 m). Weight as of this encounter: 215 lb (97.5 kg). Body mass index is 33.67 kg/m². Physical Exam    Musculoskeletal Exam:    Left Upper Extremity Exam:    Evaluation of the patient's postoperative site shows that the incision is intact and healed appropriately. Persistent ridging of the nail present. Full range of motion. There are no signs of infection, no redness, no warmth, no erythema. There is no purulent drainage noted. Patient fires AIN, PIN and ulnar nerves. Sensation is grossly intact in the median, radial and ulnar distribution. Hand is pink and appears well-perfused. Hand is warm. Consitutional: Healthy  Skin:   - Edema - mild  - Cellulitis - No    Neuro: Numbness or tingling in R/L arm: none    Psych: Affect normal    Cardiovascular: Capillary Refill < 2 seconds in upper extremities    Respiratory: Non-Labored Breathing      ROS:    Constitutional: Denies fever/chills    Respiratory: Denies SOB        An electronic signature was used to authenticate this note.   -- Lavetta Klinefelter, MD

## 2022-08-02 ENCOUNTER — TELEPHONE (OUTPATIENT)
Dept: RHEUMATOLOGY | Age: 52
End: 2022-08-02

## 2022-08-02 NOTE — TELEPHONE ENCOUNTER
Pt called to schedule a np appt. Referral received via fax, scanned into chart, scheduled for the next available and pt aware to arrive 30 mins early. Paperwork mailed on 8/2/22.

## 2023-11-16 ENCOUNTER — HOSPITAL ENCOUNTER (OUTPATIENT)
Facility: HOSPITAL | Age: 53
Discharge: HOME | End: 2023-11-16
Attending: ORTHOPAEDIC SURGERY
Payer: OTHER GOVERNMENT

## 2023-11-16 ENCOUNTER — TRANSCRIBE ORDERS (OUTPATIENT)
Facility: HOSPITAL | Age: 53
End: 2023-11-16

## 2023-11-16 DIAGNOSIS — M47.22 SPONDYLOSIS OF CERVICAL SPINE WITH MYELOPATHY AND RADICULOPATHY: ICD-10-CM

## 2023-11-16 DIAGNOSIS — M47.12 SPONDYLOSIS OF CERVICAL SPINE WITH MYELOPATHY AND RADICULOPATHY: ICD-10-CM

## 2023-11-16 DIAGNOSIS — M50.30 DDD (DEGENERATIVE DISC DISEASE), CERVICAL: ICD-10-CM

## 2023-11-16 DIAGNOSIS — Z98.1 HISTORY OF FUSION OF CERVICAL SPINE: ICD-10-CM

## 2023-11-16 DIAGNOSIS — M50.30 DDD (DEGENERATIVE DISC DISEASE), CERVICAL: Primary | ICD-10-CM

## 2023-11-16 PROCEDURE — 72141 MRI NECK SPINE W/O DYE: CPT

## 2024-07-23 ENCOUNTER — HOSPITAL ENCOUNTER (OUTPATIENT)
Facility: HOSPITAL | Age: 54
Discharge: HOME | End: 2024-07-26
Payer: OTHER GOVERNMENT

## 2024-07-23 DIAGNOSIS — M50.30 DEGENERATIVE DISC DISEASE, CERVICAL: ICD-10-CM

## 2024-07-23 PROCEDURE — 72141 MRI NECK SPINE W/O DYE: CPT

## 2024-07-23 PROCEDURE — 72125 CT NECK SPINE W/O DYE: CPT
